# Patient Record
Sex: MALE | Race: WHITE | HISPANIC OR LATINO | Employment: PART TIME | URBAN - NONMETROPOLITAN AREA
[De-identification: names, ages, dates, MRNs, and addresses within clinical notes are randomized per-mention and may not be internally consistent; named-entity substitution may affect disease eponyms.]

---

## 2024-02-08 ENCOUNTER — OFFICE VISIT (OUTPATIENT)
Dept: UROLOGY | Facility: CLINIC | Age: 29
End: 2024-02-08
Payer: MEDICAID

## 2024-02-08 ENCOUNTER — PATIENT ROUNDING (BHMG ONLY) (OUTPATIENT)
Dept: UROLOGY | Facility: CLINIC | Age: 29
End: 2024-02-08

## 2024-02-08 VITALS
DIASTOLIC BLOOD PRESSURE: 78 MMHG | HEIGHT: 69 IN | TEMPERATURE: 98 F | SYSTOLIC BLOOD PRESSURE: 116 MMHG | BODY MASS INDEX: 34.66 KG/M2 | WEIGHT: 234 LBS

## 2024-02-08 DIAGNOSIS — R31.0 GROSS HEMATURIA: Primary | ICD-10-CM

## 2024-02-08 DIAGNOSIS — R36.1 BLOOD IN SEMEN: ICD-10-CM

## 2024-02-08 LAB
BILIRUB BLD-MCNC: NEGATIVE MG/DL
CLARITY, POC: CLEAR
COLOR UR: YELLOW
EXPIRATION DATE: ABNORMAL
GLUCOSE UR STRIP-MCNC: NEGATIVE MG/DL
KETONES UR QL: NEGATIVE
LEUKOCYTE EST, POC: NEGATIVE
Lab: ABNORMAL
NITRITE UR-MCNC: NEGATIVE MG/ML
PH UR: 7.5 [PH] (ref 5–8)
PROT UR STRIP-MCNC: ABNORMAL MG/DL
RBC # UR STRIP: NEGATIVE /UL
SP GR UR: 1.01 (ref 1–1.03)
UROBILINOGEN UR QL: NORMAL

## 2024-02-08 RX ORDER — ACETAMINOPHEN 500 MG
500 TABLET ORAL
COMMUNITY

## 2024-02-08 RX ORDER — LISINOPRIL AND HYDROCHLOROTHIAZIDE 20; 12.5 MG/1; MG/1
1 TABLET ORAL DAILY
COMMUNITY
Start: 2023-10-16 | End: 2024-02-08

## 2024-02-08 RX ORDER — METOCLOPRAMIDE 10 MG/1
TABLET ORAL
COMMUNITY
Start: 2023-10-16 | End: 2024-02-08

## 2024-02-08 NOTE — PROGRESS NOTES
Office Visit Hematuria Male      Patient Name: Kenji Pizarro  : 1995   MRN: [id number]     Chief Complaint: Gross hematuria.   Chief Complaint   Patient presents with    Gross Hematuria       Referring Provider: Mahi Islas*    History of Present Illness: Kenji Pizarro is a 28 y.o. male who presents today presents with gross hematuria x 1 episode, 1 month ago.  1 week later he reports that he had blood in his ejaculation x 2 episodes.  Denies fever, chills, sweats, pain, testicular swelling, penile swelling or lesions, n/v/d.  No family history of prostate cancer or bladder cancer.  No known injury to pelvic area.     History of smoking?    Socially  History of second-hand smoking exposure? no  History of chemotherapy?   no  History of radiation?    no  History of kidney or bladder stones?  no  History of frequent urinary tract infections? no    Subjective      Review of System:   Review of Systems   Constitutional:  Negative for activity change, appetite change, chills, diaphoresis and fever.   Gastrointestinal:  Negative for abdominal pain, blood in stool, constipation, diarrhea, nausea and vomiting.   Genitourinary:  Positive for discharge (bleeding with ejacuation x 2 episodes) and hematuria. Negative for decreased urine volume, dysuria, frequency, nocturia, penile pain, erectile dysfunction, penile swelling, scrotal swelling, testicular pain, urgency and urinary incontinence.   All other systems reviewed and are negative.     Past Medical History: History reviewed. No pertinent past medical history.    Past Surgical History: History reviewed. No pertinent surgical history.    Family History:   Family History   Problem Relation Age of Onset    Hyperlipidemia Father     Hypertension Mother     Diabetes Mother     No Known Problems Sister     No Known Problems Sister     Diabetes Brother     No Known Problems Brother     No Known Problems Son      No family history of prostate, bladder  "or kidney cancer.   No family history of kidney stones.     Social History:   Social History     Socioeconomic History    Marital status: Significant Other   Tobacco Use    Smoking status: Some Days     Types: Cigarettes     Passive exposure: Never    Smokeless tobacco: Never    Tobacco comments:     1 cigarette every 2-3 months   Vaping Use    Vaping Use: Never used    Passive vaping exposure: Yes   Substance and Sexual Activity    Alcohol use: Defer    Drug use: Never    Sexual activity: Yes     Partners: Female     Medications:     Current Outpatient Medications:     acetaminophen (TYLENOL) 500 MG tablet, Take 1 tablet by mouth., Disp: , Rfl:     Allergies:   No Known Allergies    Objective     Physical Exam:   Vital Signs:   Vitals:    02/08/24 0947   BP: 116/78   BP Location: Left arm   Patient Position: Sitting   Cuff Size: Adult   Temp: 98 °F (36.7 °C)   TempSrc: Temporal   Weight: 106 kg (234 lb)   Height: 175.3 cm (69\")     Body mass index is 34.56 kg/m².   Physical Exam  Vitals and nursing note reviewed.   Constitutional:       Appearance: Normal appearance. He is well-groomed. He is obese. He is not ill-appearing.   Abdominal:      Palpations: Abdomen is soft.      Tenderness: There is no abdominal tenderness.      Hernia: No hernia is present. There is no hernia in the left inguinal area or right inguinal area.   Genitourinary:     Penis: Normal and uncircumcised. No tenderness, discharge, swelling or lesions.       Testes: Normal.         Right: Mass, tenderness or swelling not present.         Left: Mass, tenderness or swelling not present.      Epididymis:      Right: Normal.      Left: Normal.   Neurological:      Mental Status: He is alert.       Labs  Brief Urine Lab Results  (Last result in the past 365 days)        Color   Clarity   Blood   Leuk Est   Nitrite   Protein   CREAT   Urine HCG        02/08/24 0951 Yellow   Clear   Negative   Negative   Negative   2+                  Radiologic " Studies       Assessment / Plan      Assessment  28 y.o. male who presents with gross hematuria. New diagnosis of unknown clinical significance.    Risk factors include occasional smoker and ecigarette exposure at work.  In order to complete the hematuria work up we need to perform a flexible cystoscopy and acquire upper tract imaging.   UA was negative for blood and evidence of infection.    We discussed that blood with ejaculation is likely benign and will resolve on its own.     Plan  1.  We discussed the indications for diagnostic flexible cystoscopy to be performed at the next clinic visit.   2.  CT urogram ordered and to be completed prior to his follow up with Dr. Beasley.      Follow Up:   Return in about 4 weeks (around 3/7/2024) for Follow up with Dr. Beasley with Cystoscopy; CT urogram prior .      JON Bianchi, MSN, FNP-C  [facility]

## 2024-02-09 NOTE — PROGRESS NOTES
February 9, 2024    Hello, may I speak with Kenji Pizarro? Spoke with pt.    My name is Taina.      I am  with [facility] [place] [place] [facility]  [address]  [address]  [facility] [address] [phone].    Before we get started may I verify your date of birth? 1995    I am calling to officially welcome you to our practice and ask about your recent visit. Is this a good time to talk? Yes.    Tell me about your visit with us. What things went well?    Pt states his visit was great and that he looks forward to his procedure and getting some answers.     We're always looking for ways to make our patients' experiences even better. Do you have recommendations on ways we may improve?  No    Overall were you satisfied with your first visit to our practice? Very satisfied.       I appreciate you taking the time to speak with me today. Is there anything else I can do for you? No      Thank you, and have a great day.

## 2024-04-26 ENCOUNTER — TELEPHONE (OUTPATIENT)
Dept: UROLOGY | Facility: CLINIC | Age: 29
End: 2024-04-26
Payer: MEDICAID

## 2024-04-26 NOTE — TELEPHONE ENCOUNTER
I tried called patient to see when he was getting his CT due to him supposed to have it done prior to appointment. Patient did not answer and was unable to leave a voicemail to return my call.     Nomi ORTEGA

## 2025-03-17 ENCOUNTER — APPOINTMENT (OUTPATIENT)
Dept: GENERAL RADIOLOGY | Facility: HOSPITAL | Age: 30
End: 2025-03-17

## 2025-03-17 ENCOUNTER — HOSPITAL ENCOUNTER (INPATIENT)
Facility: HOSPITAL | Age: 30
LOS: 2 days | Discharge: HOME | End: 2025-03-19
Attending: EMERGENCY MEDICINE | Admitting: FAMILY MEDICINE

## 2025-03-17 ENCOUNTER — APPOINTMENT (OUTPATIENT)
Dept: CARDIOLOGY | Facility: HOSPITAL | Age: 30
End: 2025-03-17

## 2025-03-17 ENCOUNTER — APPOINTMENT (OUTPATIENT)
Dept: CT IMAGING | Facility: HOSPITAL | Age: 30
End: 2025-03-17

## 2025-03-17 ENCOUNTER — APPOINTMENT (OUTPATIENT)
Dept: ULTRASOUND IMAGING | Facility: HOSPITAL | Age: 30
End: 2025-03-17

## 2025-03-17 DIAGNOSIS — R79.89 ELEVATED TROPONIN: ICD-10-CM

## 2025-03-17 DIAGNOSIS — R60.0 PERIPHERAL EDEMA: ICD-10-CM

## 2025-03-17 DIAGNOSIS — I50.21 ACUTE SYSTOLIC CONGESTIVE HEART FAILURE: ICD-10-CM

## 2025-03-17 DIAGNOSIS — R09.02 HYPOXIA: ICD-10-CM

## 2025-03-17 DIAGNOSIS — R80.9 PROTEINURIA, UNSPECIFIED TYPE: ICD-10-CM

## 2025-03-17 DIAGNOSIS — I50.9 ACUTE ON CHRONIC CONGESTIVE HEART FAILURE, UNSPECIFIED HEART FAILURE TYPE: Primary | ICD-10-CM

## 2025-03-17 LAB
ALBUMIN SERPL-MCNC: 3.8 G/DL (ref 3.5–5.2)
ALBUMIN/GLOB SERPL: 1.3 G/DL
ALP SERPL-CCNC: 80 U/L (ref 39–117)
ALT SERPL W P-5'-P-CCNC: 43 U/L (ref 1–41)
ANION GAP SERPL CALCULATED.3IONS-SCNC: 9.6 MMOL/L (ref 5–15)
AORTIC DIMENSIONLESS INDEX: 0.69 (DI)
AST SERPL-CCNC: 46 U/L (ref 1–40)
AV MEAN PRESS GRAD SYS DOP V1V2: 3 MMHG
AV VMAX SYS DOP: 106 CM/SEC
B PARAPERT DNA SPEC QL NAA+PROBE: NOT DETECTED
B PERT DNA SPEC QL NAA+PROBE: NOT DETECTED
BACTERIA UR QL AUTO: ABNORMAL /HPF
BASOPHILS # BLD AUTO: 0.09 10*3/MM3 (ref 0–0.2)
BASOPHILS NFR BLD AUTO: 1 % (ref 0–1.5)
BH CV ECHO MEAS - AO MAX PG: 4.5 MMHG
BH CV ECHO MEAS - AO ROOT DIAM: 3.4 CM
BH CV ECHO MEAS - AO V2 VTI: 17.5 CM
BH CV ECHO MEAS - AVA(I,D): 3.6 CM2
BH CV ECHO MEAS - EDV(CUBED): 214.9 ML
BH CV ECHO MEAS - EDV(MOD-SP2): 177 ML
BH CV ECHO MEAS - EDV(MOD-SP4): 276 ML
BH CV ECHO MEAS - EF(MOD-SP2): 58.7 %
BH CV ECHO MEAS - EF(MOD-SP4): 37.3 %
BH CV ECHO MEAS - ESV(CUBED): 143.9 ML
BH CV ECHO MEAS - ESV(MOD-SP2): 73.1 ML
BH CV ECHO MEAS - ESV(MOD-SP4): 173 ML
BH CV ECHO MEAS - FS: 12.5 %
BH CV ECHO MEAS - IVS/LVPW: 1.48 CM
BH CV ECHO MEAS - IVSD: 1.84 CM
BH CV ECHO MEAS - LA DIMENSION: 3.4 CM
BH CV ECHO MEAS - LAT PEAK E' VEL: 8.7 CM/SEC
BH CV ECHO MEAS - LV DIASTOLIC VOL/BSA (35-75): 124.2 CM2
BH CV ECHO MEAS - LV MASS(C)D: 442.6 GRAMS
BH CV ECHO MEAS - LV MAX PG: 2.25 MMHG
BH CV ECHO MEAS - LV MEAN PG: 1 MMHG
BH CV ECHO MEAS - LV SYSTOLIC VOL/BSA (12-30): 77.9 CM2
BH CV ECHO MEAS - LV V1 MAX: 75 CM/SEC
BH CV ECHO MEAS - LV V1 VTI: 12 CM
BH CV ECHO MEAS - LVIDD: 6 CM
BH CV ECHO MEAS - LVIDS: 5.2 CM
BH CV ECHO MEAS - LVOT AREA: 5.3 CM2
BH CV ECHO MEAS - LVOT DIAM: 2.6 CM
BH CV ECHO MEAS - LVPWD: 1.24 CM
BH CV ECHO MEAS - MED PEAK E' VEL: 5 CM/SEC
BH CV ECHO MEAS - MV A MAX VEL: 64.5 CM/SEC
BH CV ECHO MEAS - MV DEC TIME: 0.23 SEC
BH CV ECHO MEAS - MV E MAX VEL: 92.7 CM/SEC
BH CV ECHO MEAS - MV E/A: 1.44
BH CV ECHO MEAS - MV MAX PG: 6.5 MMHG
BH CV ECHO MEAS - MV MEAN PG: 3 MMHG
BH CV ECHO MEAS - MV V2 VTI: 28.9 CM
BH CV ECHO MEAS - MVA(VTI): 2.19 CM2
BH CV ECHO MEAS - PA ACC TIME: 0.15 SEC
BH CV ECHO MEAS - PA V2 MAX: 73 CM/SEC
BH CV ECHO MEAS - RAP SYSTOLE: 8 MMHG
BH CV ECHO MEAS - RV MAX PG: 1.52 MMHG
BH CV ECHO MEAS - RV V1 MAX: 61.6 CM/SEC
BH CV ECHO MEAS - RV V1 VTI: 11 CM
BH CV ECHO MEAS - SV(LVOT): 63.2 ML
BH CV ECHO MEAS - SV(MOD-SP2): 103.9 ML
BH CV ECHO MEAS - SV(MOD-SP4): 103 ML
BH CV ECHO MEAS - SVI(LVOT): 28.5 ML/M2
BH CV ECHO MEAS - SVI(MOD-SP2): 46.8 ML/M2
BH CV ECHO MEAS - SVI(MOD-SP4): 46.4 ML/M2
BH CV ECHO MEAS - TAPSE (>1.6): 1.64 CM
BH CV ECHO MEASUREMENTS AVERAGE E/E' RATIO: 13.53
BH CV XLRA - RV BASE: 4.3 CM
BH CV XLRA - RV LENGTH: 10.4 CM
BH CV XLRA - RV MID: 3.9 CM
BH CV XLRA - TDI S': 8.8 CM/SEC
BILIRUB SERPL-MCNC: 0.9 MG/DL (ref 0–1.2)
BILIRUB UR QL STRIP: ABNORMAL
BUN SERPL-MCNC: 16 MG/DL (ref 6–20)
BUN/CREAT SERPL: 13.8 (ref 7–25)
C PNEUM DNA NPH QL NAA+NON-PROBE: NOT DETECTED
CALCIUM SPEC-SCNC: 8.7 MG/DL (ref 8.6–10.5)
CHLORIDE SERPL-SCNC: 105 MMOL/L (ref 98–107)
CLARITY UR: CLEAR
CO2 SERPL-SCNC: 24.4 MMOL/L (ref 22–29)
COLOR UR: ABNORMAL
CREAT SERPL-MCNC: 1.16 MG/DL (ref 0.76–1.27)
CREAT UR-MCNC: 325.6 MG/DL
DEPRECATED RDW RBC AUTO: 41.4 FL (ref 37–54)
EGFRCR SERPLBLD CKD-EPI 2021: 86.4 ML/MIN/1.73
EOSINOPHIL # BLD AUTO: 0.23 10*3/MM3 (ref 0–0.4)
EOSINOPHIL NFR BLD AUTO: 2.5 % (ref 0.3–6.2)
ERYTHROCYTE [DISTWIDTH] IN BLOOD BY AUTOMATED COUNT: 13.5 % (ref 12.3–15.4)
FLUAV SUBTYP SPEC NAA+PROBE: NOT DETECTED
FLUBV RNA ISLT QL NAA+PROBE: NOT DETECTED
GEN 5 1HR TROPONIN T REFLEX: 84 NG/L
GLOBULIN UR ELPH-MCNC: 3 GM/DL
GLUCOSE BLDC GLUCOMTR-MCNC: 84 MG/DL (ref 70–130)
GLUCOSE SERPL-MCNC: 98 MG/DL (ref 65–99)
GLUCOSE UR STRIP-MCNC: NEGATIVE MG/DL
HADV DNA SPEC NAA+PROBE: NOT DETECTED
HCOV 229E RNA SPEC QL NAA+PROBE: NOT DETECTED
HCOV HKU1 RNA SPEC QL NAA+PROBE: NOT DETECTED
HCOV NL63 RNA SPEC QL NAA+PROBE: NOT DETECTED
HCOV OC43 RNA SPEC QL NAA+PROBE: NOT DETECTED
HCT VFR BLD AUTO: 44.2 % (ref 37.5–51)
HGB BLD-MCNC: 14.7 G/DL (ref 13–17.7)
HGB UR QL STRIP.AUTO: NEGATIVE
HMPV RNA NPH QL NAA+NON-PROBE: NOT DETECTED
HPIV1 RNA ISLT QL NAA+PROBE: NOT DETECTED
HPIV2 RNA SPEC QL NAA+PROBE: NOT DETECTED
HPIV3 RNA NPH QL NAA+PROBE: NOT DETECTED
HPIV4 P GENE NPH QL NAA+PROBE: NOT DETECTED
HYALINE CASTS UR QL AUTO: ABNORMAL /LPF
IMM GRANULOCYTES # BLD AUTO: 0.02 10*3/MM3 (ref 0–0.05)
IMM GRANULOCYTES NFR BLD AUTO: 0.2 % (ref 0–0.5)
KETONES UR QL STRIP: ABNORMAL
LEFT ATRIUM VOLUME INDEX: 45.9 ML/M2
LEUKOCYTE ESTERASE UR QL STRIP.AUTO: ABNORMAL
LV EF BIPLANE MOD: 49.6 %
LYMPHOCYTES # BLD AUTO: 1.92 10*3/MM3 (ref 0.7–3.1)
LYMPHOCYTES NFR BLD AUTO: 21 % (ref 19.6–45.3)
M PNEUMO IGG SER IA-ACNC: NOT DETECTED
MCH RBC QN AUTO: 28.2 PG (ref 26.6–33)
MCHC RBC AUTO-ENTMCNC: 33.3 G/DL (ref 31.5–35.7)
MCV RBC AUTO: 84.7 FL (ref 79–97)
MONOCYTES # BLD AUTO: 0.67 10*3/MM3 (ref 0.1–0.9)
MONOCYTES NFR BLD AUTO: 7.3 % (ref 5–12)
NEUTROPHILS NFR BLD AUTO: 6.21 10*3/MM3 (ref 1.7–7)
NEUTROPHILS NFR BLD AUTO: 68 % (ref 42.7–76)
NITRITE UR QL STRIP: NEGATIVE
NRBC BLD AUTO-RTO: 0 /100 WBC (ref 0–0.2)
NT-PROBNP SERPL-MCNC: 2235 PG/ML (ref 0–450)
PH UR STRIP.AUTO: 7 [PH] (ref 5–8)
PLATELET # BLD AUTO: 298 10*3/MM3 (ref 140–450)
PMV BLD AUTO: 10.5 FL (ref 6–12)
POTASSIUM SERPL-SCNC: 4 MMOL/L (ref 3.5–5.2)
PROT ?TM UR-MCNC: 321 MG/DL
PROT SERPL-MCNC: 6.8 G/DL (ref 6–8.5)
PROT UR QL STRIP: ABNORMAL
PROT/CREAT UR: 985.9 MG/G CREA (ref 0–200)
RBC # BLD AUTO: 5.22 10*6/MM3 (ref 4.14–5.8)
RBC # UR STRIP: ABNORMAL /HPF
REF LAB TEST METHOD: ABNORMAL
RHINOVIRUS RNA SPEC NAA+PROBE: NOT DETECTED
RSV RNA NPH QL NAA+NON-PROBE: NOT DETECTED
SARS-COV-2 RNA RESP QL NAA+PROBE: NOT DETECTED
SODIUM SERPL-SCNC: 139 MMOL/L (ref 136–145)
SP GR UR STRIP: 1.03 (ref 1–1.03)
SQUAMOUS #/AREA URNS HPF: ABNORMAL /HPF
TROPONIN T % DELTA: 9
TROPONIN T NUMERIC DELTA: 7 NG/L
TROPONIN T SERPL HS-MCNC: 77 NG/L
UROBILINOGEN UR QL STRIP: ABNORMAL
WBC # UR STRIP: ABNORMAL /HPF
WBC NRBC COR # BLD AUTO: 9.14 10*3/MM3 (ref 3.4–10.8)

## 2025-03-17 PROCEDURE — 25510000001 IOPAMIDOL 61 % SOLUTION: Performed by: EMERGENCY MEDICINE

## 2025-03-17 PROCEDURE — 25010000002 LABETALOL 5 MG/ML SOLUTION: Performed by: FAMILY MEDICINE

## 2025-03-17 PROCEDURE — 80053 COMPREHEN METABOLIC PANEL: CPT | Performed by: EMERGENCY MEDICINE

## 2025-03-17 PROCEDURE — 93306 TTE W/DOPPLER COMPLETE: CPT

## 2025-03-17 PROCEDURE — 93005 ELECTROCARDIOGRAM TRACING: CPT | Performed by: EMERGENCY MEDICINE

## 2025-03-17 PROCEDURE — 84156 ASSAY OF PROTEIN URINE: CPT | Performed by: INTERNAL MEDICINE

## 2025-03-17 PROCEDURE — 25010000002 FUROSEMIDE PER 20 MG: Performed by: EMERGENCY MEDICINE

## 2025-03-17 PROCEDURE — 25010000002 HEPARIN (PORCINE) PER 1000 UNITS: Performed by: FAMILY MEDICINE

## 2025-03-17 PROCEDURE — 99223 1ST HOSP IP/OBS HIGH 75: CPT | Performed by: FAMILY MEDICINE

## 2025-03-17 PROCEDURE — 84484 ASSAY OF TROPONIN QUANT: CPT | Performed by: EMERGENCY MEDICINE

## 2025-03-17 PROCEDURE — 0202U NFCT DS 22 TRGT SARS-COV-2: CPT | Performed by: EMERGENCY MEDICINE

## 2025-03-17 PROCEDURE — 81001 URINALYSIS AUTO W/SCOPE: CPT | Performed by: EMERGENCY MEDICINE

## 2025-03-17 PROCEDURE — 82570 ASSAY OF URINE CREATININE: CPT | Performed by: INTERNAL MEDICINE

## 2025-03-17 PROCEDURE — 71045 X-RAY EXAM CHEST 1 VIEW: CPT

## 2025-03-17 PROCEDURE — 71275 CT ANGIOGRAPHY CHEST: CPT

## 2025-03-17 PROCEDURE — 83880 ASSAY OF NATRIURETIC PEPTIDE: CPT | Performed by: EMERGENCY MEDICINE

## 2025-03-17 PROCEDURE — 93970 EXTREMITY STUDY: CPT

## 2025-03-17 PROCEDURE — 82948 REAGENT STRIP/BLOOD GLUCOSE: CPT

## 2025-03-17 PROCEDURE — 99291 CRITICAL CARE FIRST HOUR: CPT

## 2025-03-17 PROCEDURE — 85025 COMPLETE CBC W/AUTO DIFF WBC: CPT | Performed by: EMERGENCY MEDICINE

## 2025-03-17 PROCEDURE — 99285 EMERGENCY DEPT VISIT HI MDM: CPT | Performed by: EMERGENCY MEDICINE

## 2025-03-17 PROCEDURE — 93306 TTE W/DOPPLER COMPLETE: CPT | Performed by: INTERNAL MEDICINE

## 2025-03-17 RX ORDER — FUROSEMIDE 10 MG/ML
80 INJECTION INTRAMUSCULAR; INTRAVENOUS ONCE
Status: COMPLETED | OUTPATIENT
Start: 2025-03-17 | End: 2025-03-17

## 2025-03-17 RX ORDER — NITROGLYCERIN 0.4 MG/1
0.4 TABLET SUBLINGUAL
Status: DISCONTINUED | OUTPATIENT
Start: 2025-03-17 | End: 2025-03-19 | Stop reason: HOSPADM

## 2025-03-17 RX ORDER — FUROSEMIDE 10 MG/ML
80 INJECTION INTRAMUSCULAR; INTRAVENOUS 2 TIMES DAILY
Status: DISCONTINUED | OUTPATIENT
Start: 2025-03-17 | End: 2025-03-17

## 2025-03-17 RX ORDER — LISINOPRIL 10 MG/1
10 TABLET ORAL DAILY
COMMUNITY
Start: 2025-03-14 | End: 2025-03-19 | Stop reason: HOSPADM

## 2025-03-17 RX ORDER — HEPARIN SODIUM 5000 [USP'U]/ML
5000 INJECTION, SOLUTION INTRAVENOUS; SUBCUTANEOUS EVERY 8 HOURS SCHEDULED
Status: DISCONTINUED | OUTPATIENT
Start: 2025-03-17 | End: 2025-03-18

## 2025-03-17 RX ORDER — OMEPRAZOLE 20 MG/1
20 CAPSULE, DELAYED RELEASE ORAL DAILY
COMMUNITY
Start: 2025-03-14 | End: 2025-03-24

## 2025-03-17 RX ORDER — CARVEDILOL 25 MG/1
25 TABLET ORAL ONCE
Status: COMPLETED | OUTPATIENT
Start: 2025-03-17 | End: 2025-03-17

## 2025-03-17 RX ORDER — IOPAMIDOL 612 MG/ML
100 INJECTION, SOLUTION INTRAVASCULAR
Status: COMPLETED | OUTPATIENT
Start: 2025-03-17 | End: 2025-03-17

## 2025-03-17 RX ORDER — FUROSEMIDE 10 MG/ML
40 INJECTION INTRAMUSCULAR; INTRAVENOUS ONCE
Status: COMPLETED | OUTPATIENT
Start: 2025-03-17 | End: 2025-03-17

## 2025-03-17 RX ORDER — HYDROCHLOROTHIAZIDE 12.5 MG/1
12.5 TABLET ORAL DAILY
COMMUNITY
Start: 2025-03-14 | End: 2025-03-19 | Stop reason: HOSPADM

## 2025-03-17 RX ORDER — LABETALOL HYDROCHLORIDE 5 MG/ML
10 INJECTION, SOLUTION INTRAVENOUS ONCE
Status: COMPLETED | OUTPATIENT
Start: 2025-03-17 | End: 2025-03-17

## 2025-03-17 RX ADMIN — LABETALOL HYDROCHLORIDE 10 MG: 5 INJECTION, SOLUTION INTRAVENOUS at 10:29

## 2025-03-17 RX ADMIN — HEPARIN SODIUM 5000 UNITS: 5000 INJECTION INTRAVENOUS; SUBCUTANEOUS at 13:36

## 2025-03-17 RX ADMIN — IOPAMIDOL 100 ML: 612 INJECTION, SOLUTION INTRAVENOUS at 08:12

## 2025-03-17 RX ADMIN — CARVEDILOL 25 MG: 25 TABLET, FILM COATED ORAL at 09:38

## 2025-03-17 RX ADMIN — FUROSEMIDE 80 MG: 10 INJECTION, SOLUTION INTRAMUSCULAR; INTRAVENOUS at 08:10

## 2025-03-17 RX ADMIN — FUROSEMIDE 40 MG: 10 INJECTION, SOLUTION INTRAMUSCULAR; INTRAVENOUS at 13:38

## 2025-03-17 NOTE — H&P (VIEW-ONLY)
"     [facility] Cardiology Consult Note    Kenji Pizarro  1995  [id number]  23    Requesting Physician: No ref. provider found    Chief Complaint: Shortness of breath and lower extremity swelling    History of Present Illness:   Mr. Kenji Pizarro is a 29 y.o. male who is being seen by Cardiology for evaluation of volume overload.  The patient has a past medical history significant for hypertension and GERD.  He does not have any significant past cardiac history.  The patient reports an approximate 2-week history of progressive exertional dyspnea.  He also reports a several day history of bilateral lower extremity swelling.  He has had occasional episodes of chest pain over the past 2 weeks, described as a \"sharp and stabbing\" pain located in his lower chest/epigastric area.  He reports the discomfort was worse with deep inspiration.  No associated nausea, vomiting, or diaphoresis.    Upon evaluation the emergency department, the patient was hypertensive with a systolic BP in the 180s and diastolic BP in the 130s-140s.  He was found to be volume overloaded on exam.  Initial labs showed a creatinine of 1.16, with mild elevation of his AST and ALT.  A proBNP was elevated at 2235.  An initial high-sensitivity troponin was elevated at 77, found to be 84 on recheck.  Cardiology has been consulted for further recommendations.    Prior Cardiac Testin. Last Coronary Angio: None  2. Prior Stress Testing: None  3. Last Echo: None  4. Prior Holter Monitor: None    Review of Systems:   Review of Systems   Constitutional:  Negative for activity change, appetite change, chills, diaphoresis, fatigue, fever, unexpected weight gain and unexpected weight loss.   Eyes:  Negative for blurred vision and double vision.   Respiratory:  Positive for shortness of breath. Negative for cough, chest tightness and wheezing.    Cardiovascular:  Positive for chest pain and leg swelling. Negative for palpitations. "   Gastrointestinal:  Negative for abdominal pain, anal bleeding, blood in stool and GERD.   Endocrine: Negative for cold intolerance and heat intolerance.   Genitourinary:  Negative for hematuria.   Neurological:  Negative for dizziness, syncope, weakness and light-headedness.   Hematological:  Does not bruise/bleed easily.   Psychiatric/Behavioral:  Negative for depressed mood and stress. The patient is not nervous/anxious.        Past Medical History: History reviewed. No pertinent past medical history.    Past Surgical History: History reviewed. No pertinent surgical history.    Family History:   Family History   Problem Relation Age of Onset    Hyperlipidemia Father     Hypertension Mother     Diabetes Mother     No Known Problems Sister     No Known Problems Sister     Diabetes Brother     No Known Problems Brother     No Known Problems Son        Social History:   Social History     Socioeconomic History    Marital status: Single   Tobacco Use    Smoking status: Some Days     Types: Cigarettes     Passive exposure: Never    Smokeless tobacco: Never    Tobacco comments:     1 cigarette every 2-3 months   Vaping Use    Vaping status: Never Used    Passive vaping exposure: Yes   Substance and Sexual Activity    Alcohol use: Defer    Drug use: Never    Sexual activity: Yes     Partners: Female       Medications:     Current Facility-Administered Medications:     heparin (porcine) 5000 UNIT/ML injection 5,000 Units, 5,000 Units, Subcutaneous, Q8H, Elias Cash DO, 5,000 Units at 03/17/25 1336    nitroglycerin (NITROSTAT) SL tablet 0.4 mg, 0.4 mg, Sublingual, Q5 Min PRN, Elias Cash DO    Current Outpatient Medications:     hydroCHLOROthiazide 12.5 MG tablet, Take 1 tablet by mouth Daily., Disp: , Rfl:     lisinopril (PRINIVIL,ZESTRIL) 10 MG tablet, Take 1 tablet by mouth Daily., Disp: , Rfl:     omeprazole (priLOSEC) 20 MG capsule, Take 1 capsule by mouth Daily., Disp: , Rfl:     acetaminophen (TYLENOL) 500  "MG tablet, Take 1 tablet by mouth., Disp: , Rfl:     Allergies:   No Known Allergies    Physical Exam:  Vital Signs:   Vitals:    03/17/25 1346 03/17/25 1401 03/17/25 1416 03/17/25 1418   BP: 110/79 120/87 110/74 116/82   BP Location:       Patient Position:       Pulse: 82 82 84    Resp:       Temp:       TempSrc:       SpO2: 96% 94% 95%    Weight:    116 kg (256 lb)   Height:    167.6 cm (66\")       Physical Exam  Constitutional:       General: He is not in acute distress.     Appearance: He is obese. He is not diaphoretic.   HENT:      Head: Normocephalic and atraumatic.   Cardiovascular:      Rate and Rhythm: Normal rate and regular rhythm.      Heart sounds: No murmur heard.  Pulmonary:      Effort: Pulmonary effort is normal. No respiratory distress.      Breath sounds: No stridor. No wheezing, rhonchi or rales.      Comments: Scattered crackles bilaterally  Abdominal:      General: Bowel sounds are normal. There is no distension.      Palpations: Abdomen is soft.      Tenderness: There is no abdominal tenderness. There is no guarding or rebound.   Musculoskeletal:         General: Swelling present. Normal range of motion.      Cervical back: Neck supple. No tenderness.   Skin:     General: Skin is warm and dry.   Neurological:      General: No focal deficit present.      Mental Status: He is alert and oriented to person, place, and time.   Psychiatric:         Mood and Affect: Mood normal.         Behavior: Behavior normal.         Results Review:   Results from last 7 days   Lab Units 03/17/25  0628   SODIUM mmol/L 139   POTASSIUM mmol/L 4.0   CHLORIDE mmol/L 105   CO2 mmol/L 24.4   BUN mg/dL 16   CREATININE mg/dL 1.16   CALCIUM mg/dL 8.7   BILIRUBIN mg/dL 0.9   ALK PHOS U/L 80   ALT (SGPT) U/L 43*   AST (SGOT) U/L 46*   GLUCOSE mg/dL 98     Results from last 7 days   Lab Units 03/17/25  0729 03/17/25  0628   HSTROP T ng/L 84* 77*     Results from last 7 days   Lab Units 03/17/25  0628   WBC 10*3/mm3 9.14 "   HEMOGLOBIN g/dL 14.7   HEMATOCRIT % 44.2   PLATELETS 10*3/mm3 298                   I personally viewed and interpreted the patient's EKG/Telemetry data     Assessment:  1.  Acute systolic heart failure  2.  Elevated troponin level  3.  Hypertension  4.  Obesity, BMI 41    Plan:  1.  Acute systolic heart failure  --Echocardiogram shows LVEF 35-40%, moderate global LV hypokinesis  -- Several potential etiologies, including ischemic cardiomyopathy  -- Continue Lasix for diuresis  --Agree with carvedilol  -- As volume status improves, will start Entresto  -- SGLT2 inhibitor on discharge  -- Discussed the risks and benefits of coronary angiogram to rule out ischemic cardiomyopathy, the patient is agreeable to proceed  -- N.p.o. after midnight with plans for an angiogram tomorrow morning    2.  Elevated troponin level  -- No ischemic changes on ECG  -- No current anginal symptoms  -- Suspect troponin elevation likely demand ischemia, lower suspicion for ACS  -- No need to continue trending troponins  -- Coronary angiogram tomorrow morning, as above    Thank you for allowing me to participate in the care of your patient. Please do not hesitate to contact me with additional questions or concerns.     LYNN Rainey MD  Interventional Cardiology   03/17/25  14:44 EDT

## 2025-03-17 NOTE — Clinical Note
Hemostasis started on the right radial artery. R-Band was used in achieving hemostasis. Radial compression device applied to vessel. Hemostasis achieved successfully. Closure device additional comment: 13 mL 3953

## 2025-03-17 NOTE — CASE MANAGEMENT/SOCIAL WORK
Discharge Planning Assessment   Ra     Patient Name: Kenji Pizarro  MRN: [id number]  Today's Date: 3/17/2025    Admit Date: 3/17/2025    Plan: Patient plans to return home with family; denies needs at this time   Discharge Needs Assessment       Row Name 03/17/25 1124       Living Environment    People in Home child(yogi), dependent;significant other    Name(s) of People in Home Dianne Burton, Significant Other and their 2 year old son    Current Living Arrangements home    Duration at Residence 1 1/2 years    Potentially Unsafe Housing Conditions none    In the past 12 months has the electric, gas, oil, or water company threatened to shut off services in your home? No    Primary Care Provided by self    Provides Primary Care For child(yogi)    Family Caregiver if Needed none    Quality of Family Relationships helpful;involved;supportive    Able to Return to Prior Arrangements yes       Resource/Environmental Concerns    Resource/Environmental Concerns none    Transportation Concerns none       Transportation Needs    In the past 12 months, has lack of transportation kept you from medical appointments or from getting medications? no    In the past 12 months, has lack of transportation kept you from meetings, work, or from getting things needed for daily living? No       Food Insecurity    Within the past 12 months, you worried that your food would run out before you got the money to buy more. Never true    Within the past 12 months, the food you bought just didn't last and you didn't have money to get more. Never true       Transition Planning    Patient/Family Anticipates Transition to home with family    Patient/Family Anticipated Services at Transition none    Transportation Anticipated family or friend will provide       Discharge Needs Assessment    Readmission Within the Last 30 Days no previous admission in last 30 days    Equipment Currently Used at Home none    Concerns to be Addressed discharge  planning    Do you want help finding or keeping work or a job? I do not need or want help    Do you want help with school or training? For example, starting or completing job training or getting a high school diploma, GED or equivalent No    Anticipated Changes Related to Illness none    Equipment Needed After Discharge none    Provided Post Acute Provider List? N/A    Provided Post Acute Provider Quality & Resource List? N/A    Offered/Gave Vendor List no                   Discharge Plan       Row Name 03/17/25 1127       Plan    Plan Patient plans to return home with family; denies needs at this time    Patient/Family in Agreement with Plan yes    Provided Post Acute Provider List? N/A    Provided Post Acute Provider Quality & Resource List? N/A    Plan Comments Patient is awake and able to answer questions.  His significant other is at bedside and he consents for her to be present for his DC plans.  He does not have a PCP ([facility] provider directory provided at earlier conversation).  [facility] and elects to enroll in Meds to Bed.  He does not use DME.  He drinks 6 or more drinks atleast 3 times a week; denies the need for alcohol cessation resources.  At the time of DC the patient plans to return home with his wife and 2 year old son.  Questions and concerns were addressed at the time of this conversation. Will provide additional resources and information upon request.    Final Note Plans to DC home with family                       Demographic Summary       Row Name 03/17/25 1122       General Information    Admission Type inpatient    Arrived From emergency department    Referral Source admission list    Reason for Consult discharge planning    Preferred Language English       Contact Information    Permission Granted to Share Info With                    Functional Status       Row Name 03/17/25 1122       Functional Status    Usual Activity Tolerance excellent    Current  Activity Tolerance moderate       Physical Activity    On average, how many days per week do you engage in moderate to strenuous exercise (like a brisk walk)? 0 days    On average, how many minutes do you engage in exercise at this level? 0 min    Number of minutes of exercise per week 0       Assessment of Health Literacy    How often do you have someone help you read hospital materials? Never    How often do you have problems learning about your medical condition because of difficulty understanding written information? Never    How often do you have a problem understanding what is told to you about your medical condition? Never    How confident are you filling out medical forms by yourself? Extremely    Health Literacy Excellent       Functional Status, IADL    Medications independent    Meal Preparation independent    Housekeeping independent    Laundry independent    Shopping independent    If for any reason you need help with day-to-day activities such as bathing, preparing meals, shopping, managing finances, etc., do you get the help you need? I don't need any help       Mental Status    General Appearance WDL WDL       Mental Status Summary    Recent Changes in Mental Status/Cognitive Functioning no changes                   Psychosocial       Row Name 03/17/25 1123       Values/Beliefs    Spiritual, Cultural Beliefs, Yazdanism Practices, Values that Affect Care no       Behavior WDL    Behavior WDL WDL       Emotion Mood WDL    Emotion/Mood/Affect WDL WDL       Speech WDL    Speech WDL WDL       Perceptual State WDL    Perceptual State WDL WDL       Thought Process WDL    Thought Process WDL WDL       Intellectual Performance WDL    Intellectual Performance WDL WDL                   Abuse/Neglect       Row Name 03/17/25 1123       Personal Safety    Feels Unsafe at Home or Work/School no    Feels Threatened by Someone no    Does Anyone Try to Keep You From Having Contact with Others or Doing Things Outside  Your Home? no    Physical Signs of Abuse Present no                   Legal       Row Name 03/17/25 1123       Financial Resource Strain    How hard is it for you to pay for the very basics like food, housing, medical care, and heating? Not very                   Substance Abuse       Row Name 03/17/25 1123       Substance Use    Substance Use Status current alcohol use    Last Alcohol Use 03/15/25  patient consumes 36oz alcohol atleast 3 times per week                   Patient Forms    No documentation.                     Mulu Cameron RN

## 2025-03-17 NOTE — ED PROVIDER NOTES
Subjective   History of Present Illness  29-year-old male who presents for evaluation of multitude complaints.  He reports that he awoke from his sleep at roughly 4 AM this morning with a nosebleed.  He also reports that some blood was coming out of his mouth.  That was controlled prior to arrival.  He also reports some mild central chest pain without radiation into the neck, back, shoulders, arms.  He does have some lower extremity edema but he denies dyspnea change relative to his baseline.  He denies sick contacts.  No upper respiratory infectious symptoms.  No history of DVT or PE.  He is awake and alert with normal mentation.  He answers questions appropriate and appears nontoxic in no acute distress.      Review of Systems   Constitutional:  Positive for fatigue. Negative for chills and fever.   HENT:  Positive for nosebleeds. Negative for congestion, ear pain, postnasal drip, sinus pressure and sore throat.    Eyes:  Negative for pain, redness and visual disturbance.   Respiratory:  Positive for shortness of breath. Negative for cough and chest tightness.    Cardiovascular:  Positive for chest pain. Negative for palpitations and leg swelling.   Gastrointestinal:  Negative for abdominal pain, anal bleeding, blood in stool, diarrhea, nausea and vomiting.   Endocrine: Negative for polydipsia and polyuria.   Genitourinary:  Negative for difficulty urinating, dysuria, frequency and urgency.   Musculoskeletal:  Negative for arthralgias, back pain and neck pain.   Skin:  Negative for pallor and rash.   Allergic/Immunologic: Negative for environmental allergies and immunocompromised state.   Neurological:  Negative for dizziness, weakness and headaches.   Hematological:  Negative for adenopathy.   Psychiatric/Behavioral:  Negative for confusion, self-injury and suicidal ideas. The patient is not nervous/anxious.    All other systems reviewed and are negative.      History reviewed. No pertinent past medical  history.    No Known Allergies    Past Surgical History:   Procedure Laterality Date    CARDIAC CATHETERIZATION N/A 3/18/2025    Procedure: Coronary angiography;  Surgeon: Jose Rainey MD;  Location: [facility] CATH INVASIVE LOCATION;  Service: Cardiovascular;  Laterality: N/A;       Family History   Problem Relation Age of Onset    Hyperlipidemia Father     Hypertension Mother     Diabetes Mother     No Known Problems Sister     No Known Problems Sister     Diabetes Brother     No Known Problems Brother     No Known Problems Son        Social History     Socioeconomic History    Marital status: Single   Tobacco Use    Smoking status: Some Days     Types: Cigarettes     Passive exposure: Never    Smokeless tobacco: Never    Tobacco comments:     1 cigarette every 2-3 months   Vaping Use    Vaping status: Never Used    Passive vaping exposure: Yes   Substance and Sexual Activity    Alcohol use: Defer    Drug use: Never    Sexual activity: Yes     Partners: Female           Objective   Physical Exam  Vitals and nursing note reviewed.   Constitutional:       General: He is not in acute distress.     Appearance: Normal appearance. He is well-developed. He is not toxic-appearing or diaphoretic.   HENT:      Head: Normocephalic and atraumatic.      Right Ear: External ear normal.      Left Ear: External ear normal.      Nose: Nose normal.   Eyes:      General: Lids are normal.      Pupils: Pupils are equal, round, and reactive to light.   Neck:      Trachea: No tracheal deviation.   Cardiovascular:      Rate and Rhythm: Normal rate and regular rhythm.      Pulses: No decreased pulses.      Heart sounds: Normal heart sounds. No murmur heard.     No friction rub. No gallop.   Pulmonary:      Effort: Pulmonary effort is normal. No respiratory distress.      Breath sounds: Normal breath sounds. No decreased breath sounds, wheezing, rhonchi or rales.   Abdominal:      General: Bowel sounds are normal.      Palpations:  Abdomen is soft.      Tenderness: There is no abdominal tenderness. There is no guarding or rebound.   Musculoskeletal:         General: No deformity. Normal range of motion.      Cervical back: Normal range of motion and neck supple.      Right lower le+ Pitting Edema present.      Left lower le+ Pitting Edema present.   Lymphadenopathy:      Cervical: No cervical adenopathy.   Skin:     General: Skin is warm and dry.      Findings: No rash.   Neurological:      Mental Status: He is alert and oriented to person, place, and time.      Cranial Nerves: No cranial nerve deficit.      Sensory: No sensory deficit.   Psychiatric:         Speech: Speech normal.         Behavior: Behavior normal.         Thought Content: Thought content normal.         Judgment: Judgment normal.         Critical Care    Performed by: Kalpana Conteh MD  Authorized by: Kalpana Conteh MD    Critical care provider statement:     Critical care time (minutes):  45    Critical care time was exclusive of:  Separately billable procedures and treating other patients    Critical care was necessary to treat or prevent imminent or life-threatening deterioration of the following conditions:  Cardiac failure    Critical care was time spent personally by me on the following activities:  Development of treatment plan with patient or surrogate, discussions with consultants, evaluation of patient's response to treatment, blood draw for specimens, examination of patient, obtaining history from patient or surrogate, ordering and performing treatments and interventions, ordering and review of laboratory studies, ordering and review of radiographic studies, pulse oximetry, re-evaluation of patient's condition and review of old charts    I assumed direction of critical care for this patient from another provider in my specialty: no      Care discussed with: admitting provider               ED Course  ED Course as of 25 1018   Mon Mar  17, 2025   0746 The nurses have observed periods of decreased oxygen saturations.  They will drop relatively quickly but recovered relatively quickly.  His mentation remains normal during this time and he is not sleeping during these episodes. [NS]   0752 EKG performed 3/17/2025 at 6:19 AM interpreted by myself shows sinus tachycardia, right bundle branch block, pulmonary disease pattern, normal QRS, normal QTc.  No acute ST changes. [NS]   0925 The patient presents with complaint of shortness of breath.  Has 2+ lower extremity edema.  Had oxygen saturations at times that were observed into the low to mid 80s.  Chest imaging shows cardiomegaly but no pulmonary embolism.  He has crackles that give the concern for some pulmonary edema but no significant pulmonary edema reported on CT imaging.  BNP is greater than 2200.  Blood pressure was elevated with systolics between 150s to 180s.  Diastolics between 1 18-1 33.  I discussed this with Dr. Rainey and he recommends carvedilol which has been ordered.  Oxygen has been applied for correction of hypoxia.  80 mg of IV Lasix has been ordered.  The patient is stable with normal mentation at this time. The troponin was trending up from 77-84.  EKG does not show ischemic changes.  Dr. Rainey does not recommend non-STEMI medications at this time. [NS]      ED Course User Index  [NS] Kalpana Conteh MD                                                       Medical Decision Making  Differential includes hypertensive emergency, CHF exacerbation, COPD, emphysema, nephrotic syndrome, other unspecified etiology.    COVID and flu test are negative.    Chest x-ray shows cardiomegaly and pulmonary vascular congestion consistent with CHF.  IV diuresis was initiated.    Venous duplex of the bilateral legs is negative for blood clot.    CT angiogram of chest is negative for pulmonary embolism.CT angio also reports cardiomegaly, small pericardial and right pleural effusion.    Troponin is  elevated, trended up on repeat evaluation.    As stated urine does not show infection but does show a significant mount of protein which has been present previously.    Nephrology has been consulted.    Given the patient's elevated blood pressure also discussed the patient with the cardiologist, Dr. Rainey.  He recommends carvedilol which has been ordered.    I discussed the patient with the hospitalist, Dr. Cash, who will consult for admission.    Problems Addressed:  Acute on chronic congestive heart failure, unspecified heart failure type: complicated acute illness or injury with systemic symptoms that poses a threat to life or bodily functions  Elevated troponin: complicated acute illness or injury with systemic symptoms that poses a threat to life or bodily functions  Hypoxia: complicated acute illness or injury with systemic symptoms  Peripheral edema: complicated acute illness or injury with systemic symptoms that poses a threat to life or bodily functions  Proteinuria, unspecified type: complicated acute illness or injury    Amount and/or Complexity of Data Reviewed  Independent Historian: friend     Details: Friend provides additional information.  External Data Reviewed: labs, radiology, ECG and notes.  Labs: ordered. Decision-making details documented in ED Course.  Radiology: ordered and independent interpretation performed. Decision-making details documented in ED Course.  ECG/medicine tests: ordered and independent interpretation performed. Decision-making details documented in ED Course.    Risk  Prescription drug management.  Decision regarding hospitalization.        Final diagnoses:   Acute on chronic congestive heart failure, unspecified heart failure type   Hypoxia   Peripheral edema   Elevated troponin   Proteinuria, unspecified type       ED Disposition  ED Disposition       ED Disposition   Decision to Admit    Condition   --    Comment   Level of Care: Telemetry [5]   Diagnosis: CHF  (congestive heart failure) [237688]   Admitting Physician: PEDRO BELL [[id number]]   Certification: I Certify That Inpatient Hospital Services Are Medically Necessary For Greater Than 2 Midnights                 No follow-up provider specified.       Medication List        New Prescriptions      dapagliflozin 5 MG tablet tablet  Commonly known as: FARXIGA  Take 1 tablet by mouth Daily.     furosemide 40 MG tablet  Commonly known as: Lasix  Take 1 tablet by mouth Daily.     metoprolol succinate XL 50 MG 24 hr tablet  Commonly known as: TOPROL-XL  Take 1 tablet by mouth Daily.  Start taking on: March 20, 2025     sacubitril-valsartan 24-26 MG tablet  Commonly known as: Entresto  Take 1 tablet by mouth 2 (Two) Times a Day.            Stop      hydroCHLOROthiazide 12.5 MG tablet     lisinopril 10 MG tablet  Commonly known as: PRINIVIL,ZESTRIL               Where to Get Your Medications        These medications were sent to [facility] [place] [address]      Hours: Monday to Friday 8 AM to 6 PM Phone: [phone]   dapagliflozin 5 MG tablet tablet  furosemide 40 MG tablet  metoprolol succinate XL 50 MG 24 hr tablet  sacubitril-valsartan 24-26 MG tablet            Kalpana Conteh MD  03/19/25 0545

## 2025-03-17 NOTE — ED NOTES
Pt oxygen saturation dropping pretty frequently, pt dropped to 82% on RA, pt placed on 2LNC. Dr. Conteh notified.

## 2025-03-17 NOTE — CASE MANAGEMENT/SOCIAL WORK
Case Management/Social Work    Patient Name:  Kenji Pizarro  YOB: 1995  MRN: [id number]  Admit Date:  3/17/2025    Noted that the patient does not have a PCP.  Spoke to patient at bedside.  Provided with [facility] provider directory and information for [facility] and [facility].  Patient is accepting of resources.    Electronically signed by:  Mulu Cameron RN  03/17/25 09:11 EDT   [facility] Call Center    Phone Message    May a detailed message be left on voicemail: yes     Reason for Call: Pt is requesting a call back to schedule a colposcopy.  Thanks.    Action Taken: Message routed to:  [facility] p [phone]    Travel Screening: Not Applicable

## 2025-03-17 NOTE — Clinical Note
Level of Care: Telemetry [5]   Diagnosis: CHF (congestive heart failure) [197293]   Admitting Physician: PEDRO BELL [[id number]]   Certification: I Certify That Inpatient Hospital Services Are Medically Necessary For Greater Than 2 Midnights

## 2025-03-17 NOTE — NURSING NOTE
Admission    Denies chest pain. Placed on 2L 02. 88% sp02 intermittently room air. Sp02>90% on 2LNC 02.

## 2025-03-17 NOTE — CONSULTS
"     [facility] Cardiology Consult Note    Kenji Pizarro  1995  [id number]  25    Requesting Physician: No ref. provider found    Chief Complaint: Shortness of breath and lower extremity swelling    History of Present Illness:   Mr. Kenji Pizarro is a 29 y.o. male who is being seen by Cardiology for evaluation of volume overload.  The patient has a past medical history significant for hypertension and GERD.  He does not have any significant past cardiac history.  The patient reports an approximate 2-week history of progressive exertional dyspnea.  He also reports a several day history of bilateral lower extremity swelling.  He has had occasional episodes of chest pain over the past 2 weeks, described as a \"sharp and stabbing\" pain located in his lower chest/epigastric area.  He reports the discomfort was worse with deep inspiration.  No associated nausea, vomiting, or diaphoresis.    Upon evaluation the emergency department, the patient was hypertensive with a systolic BP in the 180s and diastolic BP in the 130s-140s.  He was found to be volume overloaded on exam.  Initial labs showed a creatinine of 1.16, with mild elevation of his AST and ALT.  A proBNP was elevated at 2235.  An initial high-sensitivity troponin was elevated at 77, found to be 84 on recheck.  Cardiology has been consulted for further recommendations.    Prior Cardiac Testin. Last Coronary Angio: None  2. Prior Stress Testing: None  3. Last Echo: None  4. Prior Holter Monitor: None    Review of Systems:   Review of Systems   Constitutional:  Negative for activity change, appetite change, chills, diaphoresis, fatigue, fever, unexpected weight gain and unexpected weight loss.   Eyes:  Negative for blurred vision and double vision.   Respiratory:  Positive for shortness of breath. Negative for cough, chest tightness and wheezing.    Cardiovascular:  Positive for chest pain and leg swelling. Negative for palpitations. "   Gastrointestinal:  Negative for abdominal pain, anal bleeding, blood in stool and GERD.   Endocrine: Negative for cold intolerance and heat intolerance.   Genitourinary:  Negative for hematuria.   Neurological:  Negative for dizziness, syncope, weakness and light-headedness.   Hematological:  Does not bruise/bleed easily.   Psychiatric/Behavioral:  Negative for depressed mood and stress. The patient is not nervous/anxious.        Past Medical History: History reviewed. No pertinent past medical history.    Past Surgical History: History reviewed. No pertinent surgical history.    Family History:   Family History   Problem Relation Age of Onset    Hyperlipidemia Father     Hypertension Mother     Diabetes Mother     No Known Problems Sister     No Known Problems Sister     Diabetes Brother     No Known Problems Brother     No Known Problems Son        Social History:   Social History     Socioeconomic History    Marital status: Single   Tobacco Use    Smoking status: Some Days     Types: Cigarettes     Passive exposure: Never    Smokeless tobacco: Never    Tobacco comments:     1 cigarette every 2-3 months   Vaping Use    Vaping status: Never Used    Passive vaping exposure: Yes   Substance and Sexual Activity    Alcohol use: Defer    Drug use: Never    Sexual activity: Yes     Partners: Female       Medications:     Current Facility-Administered Medications:     heparin (porcine) 5000 UNIT/ML injection 5,000 Units, 5,000 Units, Subcutaneous, Q8H, Elias Cash DO, 5,000 Units at 03/17/25 1336    nitroglycerin (NITROSTAT) SL tablet 0.4 mg, 0.4 mg, Sublingual, Q5 Min PRN, Elias Cash DO    Current Outpatient Medications:     hydroCHLOROthiazide 12.5 MG tablet, Take 1 tablet by mouth Daily., Disp: , Rfl:     lisinopril (PRINIVIL,ZESTRIL) 10 MG tablet, Take 1 tablet by mouth Daily., Disp: , Rfl:     omeprazole (priLOSEC) 20 MG capsule, Take 1 capsule by mouth Daily., Disp: , Rfl:     acetaminophen (TYLENOL) 500  "MG tablet, Take 1 tablet by mouth., Disp: , Rfl:     Allergies:   No Known Allergies    Physical Exam:  Vital Signs:   Vitals:    03/17/25 1346 03/17/25 1401 03/17/25 1416 03/17/25 1418   BP: 110/79 120/87 110/74 116/82   BP Location:       Patient Position:       Pulse: 82 82 84    Resp:       Temp:       TempSrc:       SpO2: 96% 94% 95%    Weight:    116 kg (256 lb)   Height:    167.6 cm (66\")       Physical Exam  Constitutional:       General: He is not in acute distress.     Appearance: He is obese. He is not diaphoretic.   HENT:      Head: Normocephalic and atraumatic.   Cardiovascular:      Rate and Rhythm: Normal rate and regular rhythm.      Heart sounds: No murmur heard.  Pulmonary:      Effort: Pulmonary effort is normal. No respiratory distress.      Breath sounds: No stridor. No wheezing, rhonchi or rales.      Comments: Scattered crackles bilaterally  Abdominal:      General: Bowel sounds are normal. There is no distension.      Palpations: Abdomen is soft.      Tenderness: There is no abdominal tenderness. There is no guarding or rebound.   Musculoskeletal:         General: Swelling present. Normal range of motion.      Cervical back: Neck supple. No tenderness.   Skin:     General: Skin is warm and dry.   Neurological:      General: No focal deficit present.      Mental Status: He is alert and oriented to person, place, and time.   Psychiatric:         Mood and Affect: Mood normal.         Behavior: Behavior normal.         Results Review:   Results from last 7 days   Lab Units 03/17/25  0628   SODIUM mmol/L 139   POTASSIUM mmol/L 4.0   CHLORIDE mmol/L 105   CO2 mmol/L 24.4   BUN mg/dL 16   CREATININE mg/dL 1.16   CALCIUM mg/dL 8.7   BILIRUBIN mg/dL 0.9   ALK PHOS U/L 80   ALT (SGPT) U/L 43*   AST (SGOT) U/L 46*   GLUCOSE mg/dL 98     Results from last 7 days   Lab Units 03/17/25  0729 03/17/25  0628   HSTROP T ng/L 84* 77*     Results from last 7 days   Lab Units 03/17/25  0628   WBC 10*3/mm3 9.14 "   HEMOGLOBIN g/dL 14.7   HEMATOCRIT % 44.2   PLATELETS 10*3/mm3 298                   I personally viewed and interpreted the patient's EKG/Telemetry data     Assessment:  1.  Acute systolic heart failure  2.  Elevated troponin level  3.  Hypertension  4.  Obesity, BMI 41    Plan:  1.  Acute systolic heart failure  --Echocardiogram shows LVEF 35-40%, moderate global LV hypokinesis  -- Several potential etiologies, including ischemic cardiomyopathy  -- Continue Lasix for diuresis  --Agree with carvedilol  -- As volume status improves, will start Entresto  -- SGLT2 inhibitor on discharge  -- Discussed the risks and benefits of coronary angiogram to rule out ischemic cardiomyopathy, the patient is agreeable to proceed  -- N.p.o. after midnight with plans for an angiogram tomorrow morning    2.  Elevated troponin level  -- No ischemic changes on ECG  -- No current anginal symptoms  -- Suspect troponin elevation likely demand ischemia, lower suspicion for ACS  -- No need to continue trending troponins  -- Coronary angiogram tomorrow morning, as above    Thank you for allowing me to participate in the care of your patient. Please do not hesitate to contact me with additional questions or concerns.     LYNN Rainey MD  Interventional Cardiology   03/17/25  14:44 EDT

## 2025-03-17 NOTE — H&P
[facility]   HISTORY AND PHYSICAL    Patient Name: Kenji Pizarro  : 1995  MRN: [id number]  Primary Care Physician:  Provider, No Known  Date of admission: 3/17/2025    Subjective   Subjective     Chief Complaint: Chest pain    History of Present Illness  Patient is a 29-year-old male with past medical history of hypertension and GERD.  Patient presents to the emergency department brought in initially because of the nosebleed with some blood coming from his mouth that awoke him from sleep.  Those were stopped before even making it into the ED however he did have some midsternal chest pain with radiation into his back and shoulder.  Patient denies significant shortness of breath but does have edema of his lower extremities bilaterally.  No history of CHF.  On physical exam he does have some crackles in his lungs and he has been tachycardic with a greatly elevated blood pressure during his ER stay.  The patient's troponin levels have trended upwards but are only mildly elevated.  The case was discussed with cardiology who recommended that the patient be admitted to continue trending troponins but not to start anticoagulation just yet.  Review of Systems   As stated above in his present illness all other systems were reviewed and subsequently negative  Personal History     Past medical history  Hypertension  GERD    History reviewed. No pertinent surgical history.    Family History: family history includes Diabetes in his brother and mother; Hyperlipidemia in his father; Hypertension in his mother; No Known Problems in his brother, sister, sister, and son. Otherwise pertinent FHx was reviewed and not pertinent to current issue.    Social History:  reports that he has been smoking cigarettes. He has never been exposed to tobacco smoke. He has never used smokeless tobacco. Alcohol use questions deferred to the physician. He reports that he does not use drugs.    Home Medications:  acetaminophen,  hydroCHLOROthiazide, lisinopril, and omeprazole    Allergies:  No Known Allergies    Objective    Objective     Vitals:   Temp:  [98.2 °F (36.8 °C)] 98.2 °F (36.8 °C)  Heart Rate:  [101-113] 105  Resp:  [20] 20  BP: (157-182)/(118-144) 157/118    Physical Exam  Constitutional:  Well-developed and well-nourished.  No acute distress.      HENT:  Head:  Normocephalic and atraumatic.  Mouth:  Moist mucous membranes.    Eyes:  Conjunctivae and EOM are normal. No scleral icterus.    Neck:  Neck supple.  No JVD present.    Cardiovascular:  Normal rate, regular rhythm and normal heart sounds with no murmur.  Pulmonary/Chest:  No respiratory distress, no wheezes, no crackles, with normal breath sounds and good air movement.  Abdominal:  Soft. No distension and no tenderness.   Musculoskeletal:  No tenderness, and no deformity.  No red or swollen joints anywhere.    Neurological:  Alert and oriented to person, place, and time.  No cranial nerve deficit.    Skin:  Skin is warm and dry. No rash noted. No pallor.   Peripheral vascular:  No clubbing, no cyanosis, no edema.  Psychiatric: Appropriate mood and affect  :    Result Review    Result Review:  I have personally reviewed the results from the time of this admission to 3/17/2025 09:35 EDT and agree with these findings:  [x]  Laboratory list / accordion  []  Microbiology  [x]  Radiology  []  EKG/Telemetry   []  Cardiology/Vascular   []  Pathology  []  Old records  []  Other:  Most notable findings include:    High Sensitivity Troponin T 1Hr  Collected: 03/17/25 0729  Final result  Specimen: Blood     HS Troponin T 84 High Critical  ng/L Troponin T % Delta 9   Troponin T Numeric Delta 7 ng/L            03/17/25 0714  BNP  Collected: 03/17/25 0628  Final result  Specimen: Blood    proBNP 2,235.0 High  pg/mL            03/17/25 0712  High Sensitivity Troponin T  Collected: 03/17/25 0628  Final result  Specimen: Blood    HS Troponin T 77 High Critical  ng/L             03/17/25 0710  Urinalysis, Microscopic Only - Urine, Clean Catch  Collected: 03/17/25 0629  Final result  Specimen: Urine, Clean Catch    RBC, UA 0-2 /HPF Squamous Epithelial Cells, UA 0-2 /HPF   WBC, UA 6-10 Abnormal  /HPF Hyaline Casts, UA None Seen /LPF   Bacteria, UA Trace Abnormal  /HPF Methodology Manual Light Microscopy          03/17/25 0658  Comprehensive Metabolic Panel  Collected: 03/17/25 0628  Final result  Specimen: Blood    Glucose 98 mg/dL ALT (SGPT) 43 High  U/L   BUN 16 mg/dL AST (SGOT) 46 High  U/L    Creatinine 1.16 mg/dL Alkaline Phosphatase 80 U/L   Sodium 139 mmol/L Total Bilirubin 0.9 mg/dL   Potassium 4.0 mmol/L  Globulin 3.0 gm/dL   Chloride 105 mmol/L A/G Ratio 1.3 g/dL   CO2 24.4 mmol/L BUN/Creatinine Ratio 13.8   Calcium 8.7 mg/dL Anion Gap 9.6 mmol/L   Total Protein 6.8 g/dL eGFR 86.4 mL/min/1.73   Albumin 3.8 g/dL            03/17/25 0653  Urinalysis With Microscopic If Indicated (No Culture) - Urine, Clean Catch  Collected: 03/17/25 0629  Final result  Specimen: Urine, Clean Catch    Color, UA Dark Yellow Abnormal  Bilirubin, UA Small (1+) Abnormal    Appearance, UA Clear Blood, UA Negative   pH, UA 7.0 Protein, UA >=300 mg/dL (3+) Abnormal    Specific Gravity, UA 1.029 Leuk Esterase, UA Small (1+) Abnormal    Glucose, UA Negative Nitrite, UA Negative   Ketones, UA Trace Abnormal  Urobilinogen, UA 1.0 E.U./dL          03/17/25 0637  CBC & Differential  Collected: 03/17/25 0628  Final result  Specimen: Blood           03/17/25 0637  CBC Auto Differential  Collected: 03/17/25 0628  Final result  Specimen: Blood    WBC 9.14 10*3/mm3 Lymphocyte % 21.0 %   RBC 5.22 10*6/mm3 Monocyte % 7.3 %   Hemoglobin 14.7 g/dL Eosinophil % 2.5 %   Hematocrit 44.2 % Basophil % 1.0 %   MCV 84.7 fL Immature Grans % 0.2 %   MCH 28.2 pg Neutrophils, Absolute 6.21 10*3/mm3   MCHC 33.3 g/dL Lymphocytes, Absolute 1.92 10*3/mm3   RDW 13.5 % Monocytes, Absolute 0.67 10*3/mm3   RDW-SD 41.4 fl  Eosinophils, Absolute 0.23 10*3/mm3   MPV 10.5 fL Basophils, Absolute 0.09 10*3/mm3   Platelets 298 10*3/mm3 Immature Grans, Absolute 0.02 10*3/mm3   Neutrophil % 68.0 % nRBC 0.0 /100 WBC          Assessment & Plan   Assessment / Plan     Brief Patient Summary:  Kenji Pizarro is a 29 y.o. male who presents to the emergency department complaining of chest pain and nosebleeds.  His blood pressure was found to be greatly elevated    Active Hospital Problems:  Presumed congestive heart failure NOS-POA  Accelerated hypertension  Chest pain with elevated troponin rule out NSTEMI type I versus type II  - Patient will be admitted to the hospital service  - We will continue to trend troponins  - Have ordered a 2D echo for better evaluation of cardiac output  - Cardiology has been consulted and recommended carvedilol for both blood pressure control and for presumed congestive heart failure  - Strict I's and O's  - We will keep him on telemetry for closer monitoring  - We will diurese with Lasix 80 mg IV twice daily  - Cardiac diet  - I will defer to cardiology for definitive testing for possible CAD    Transaminitis  - Ultrasound right upper quadrant  - GGT  - We will get a more detailed history of alcohol use        VTE Prophylaxis:  No VTE prophylaxis order currently exists.        CODE STATUS:       Admission Status:  I believe this patient meets inpatient status.    Elias Cash,

## 2025-03-17 NOTE — ED NOTES
[facility] contacted for a consult per Dr. Conteh, awaiting a call back from Dr. Wayne at this time.

## 2025-03-17 NOTE — ED NOTES
[facility]iology contacted for a consult per Dr. Conteh. Dr. Rainey, cardiologist, on the line to speak with our provider. Call transferred to MD 1 phone.

## 2025-03-17 NOTE — CONSULTS
[facility]      Nephrology Consultation      Referring Provider:   No ref. provider found    Reason for Consultation:  Hypertensive urgency and proteinuria.    Subjective:  Chief complaint   Chief Complaint   Patient presents with    Nose Bleed     History of present illness:    Patient is 29-year-old male who is morbidly obese presented with nosebleed this morning, he woke up around 4 AM and noticed that he was having a nosebleed throughout the blood coming out of his mouth.  He also reported some chest pain but it was nonradiating.  Denies any significant cough or sputum production no fever or chills denies any sick contacts.  In the ER he was noted to have very high blood pressure, initial blood pressure was 182/133, he was started on IV medications, noted to have significantly enlarged heart.  Patient had a CT of the chest with contrast to rule out PE it was reported as negative.  Increased troponin, cardiology was consulted and patient was admitted through the hospitalist service for further evaluation and treatment.  I was consulted as patient noted to have significant proteinuria and a spot UA as well as edema.  When I saw the patient was laying in the bed blood pressure was much better controlled after he received all the medications as well as diuretics.  He did not have any symptoms there was no nosebleed.  He does not follow-up with any doctor but has taken any medicines.  He denies taking any nonsteroidals and no family history of renal disease.  I have reviewed labs/imaging/records from this hospitalization, including ER staff and admitting/attending physicians H/P's and progress notes to establish a comprehensive understanding of this patient's clinical hospital course, as well as to establish plan of care appropriately.     History reviewed. No pertinent past medical history.    History reviewed. No pertinent surgical history.  Family History   Problem Relation Age of Onset     "Hyperlipidemia Father     Hypertension Mother     Diabetes Mother     No Known Problems Sister     No Known Problems Sister     Diabetes Brother     No Known Problems Brother     No Known Problems Son      negative h/o ESRD     Social History     Tobacco Use    Smoking status: Some Days     Types: Cigarettes     Passive exposure: Never    Smokeless tobacco: Never    Tobacco comments:     1 cigarette every 2-3 months   Vaping Use    Vaping status: Never Used    Passive vaping exposure: Yes   Substance Use Topics    Alcohol use: Defer    Drug use: Never     Home medications:   Prior to Admission Medications       Prescriptions Last Dose Informant Patient Reported? Taking?    hydroCHLOROthiazide 12.5 MG tablet   Yes Yes    Take 1 tablet by mouth Daily.    lisinopril (PRINIVIL,ZESTRIL) 10 MG tablet   Yes Yes    Take 1 tablet by mouth Daily.    omeprazole (priLOSEC) 20 MG capsule   Yes Yes    Take 1 capsule by mouth Daily.    acetaminophen (TYLENOL) 500 MG tablet   Yes No    Take 1 tablet by mouth.          Emergency department medications:   Medications   furosemide (LASIX) injection 80 mg (80 mg Intravenous Given 3/17/25 0810)   iopamidol (ISOVUE-300) 61 % injection 100 mL (100 mL Intravenous Given 3/17/25 0812)   carvedilol (COREG) tablet 25 mg (25 mg Oral Given 3/17/25 0938)   labetalol (NORMODYNE,TRANDATE) injection 10 mg (10 mg Intravenous Given 3/17/25 1029)       Allergies:  Patient has no known allergies.    Review of Systems  14 point review of system were done and are negative except as mentioned in the history of present illness and assessment and plan.      Physical Exam:  Objective:  Vital Signs  BP (!) 158/114   Pulse 109   Temp 98.2 °F (36.8 °C) (Oral)   Resp 20   Ht 167.6 cm (66\")   Wt 116 kg (256 lb)   SpO2 97%   BMI 41.32 kg/m²   Objective    I/O this shift:  In: -   Out: 2575 [Urine:2575]    Intake/Output Summary (Last 24 hours) at 3/17/2025 1102  Last data filed at 3/17/2025 1033  Gross per 24 " "hour   Intake --   Output 2575 ml   Net -2575 ml        Physical Exam     Constitutional: Awake, alert  Eyes: sclerae anicteric, no conjunctival injection  HEENT: mucous membranes dry  Neck: Supple, no thyromegaly, no lymphadenopathy, trachea midline, No JVD  Respiratory: Clear to auscultation bilaterally, nonlabored respirations   Cardiovascular: RRR, no murmurs, rubs, or gallops.  Gastrointestinal: Positive bowel sounds, obese, soft, nontender, nondistended  Musculoskeletal: 1-2+ edema, no clubbing or cyanosis  Psychiatric: Appropriate affect, cooperative  Neurologic: Oriented x 3, moving all extremities, Cranial Nerves grossly intact, speech clear  Skin: warm and dry, no rashes            Results Review:   Results from last 7 days   Lab Units 03/17/25  0628   SODIUM mmol/L 139   POTASSIUM mmol/L 4.0   CHLORIDE mmol/L 105   CO2 mmol/L 24.4   BUN mg/dL 16   CREATININE mg/dL 1.16   CALCIUM mg/dL 8.7   ALBUMIN g/dL 3.8   BILIRUBIN mg/dL 0.9   ALK PHOS U/L 80   ALT (SGPT) U/L 43*   AST (SGOT) U/L 46*   GLUCOSE mg/dL 98     Estimated Creatinine Clearance: 110.5 mL/min (by C-G formula based on SCr of 1.16 mg/dL).          Results from last 7 days   Lab Units 03/17/25  0628   WBC 10*3/mm3 9.14   HEMOGLOBIN g/dL 14.7   PLATELETS 10*3/mm3 298         Brief Urine Lab Results  (Last result in the past 365 days)        Color   Clarity   Blood   Leuk Est   Nitrite   Protein   CREAT   Urine HCG        03/17/25 0629 Dark Yellow   Clear   Negative   Small (1+)   Negative   >=300 mg/dL (3+)                 No results found for: \"UTPCR\"  Imaging Results (Last 24 Hours)       Procedure Component Value Units Date/Time    US Venous Doppler Lower Extremity Bilateral (duplex) [[id number]] Collected: 03/17/25 0859     Updated: 03/17/25 0902    Narrative:      BILATERAL LOWER EXTREMITY VENOUS DUPLEX DOPPLER EXAMINATION     HISTORY: edema; I50.9-Heart failure, unspecified; R09.02-Hypoxemia;  R60.0-Localized edema; R79.89-Other specified " abnormal findings of blood  chemistry     COMPARISON:   None     PROCEDURE: Multiple transverse and longitudinal scans were performed of  both femoropopliteal deep venous system, with augmentation and  compression maneuvers.     FINDINGS: Proper phasic flow was noted in the visualized deep venous  system. No intraluminal increased echogenicity is noted to suggest  thrombus. There is proper compression and augmentation of the venous  structures. No abnormal venous collaterals are seen.       Impression:      No evidence of left or right lower extremity deep venous  thrombosis.                 This report was signed and finalized on 3/17/2025 9:00 AM by Kelsey Rosenthal MD.       CT Angiogram Chest [[id number]] Collected: 03/17/25 0826     Updated: 03/17/25 0829    Narrative:      PROCEDURE: CT ANGIOGRAM CHEST-     HISTORY: dyspnea/chest pain/hypoxia; I50.9-Heart failure, unspecified;  R09.02-Hypoxemia; R60.0-Localized edema; R79.89-Other specified abnormal  findings of blood chemistry, shortness of breath     COMPARISON: None.     TECHNIQUE: The patient was injected with  IV contrast. Axial images were  obtained through the chest in a CTA/ PE protocol. 3 D Reconstruction  images were also performed. This study was performed with techniques to  keep radiation doses as low as reasonably achievable, (ALARA).  Individualized dose reduction techniques using automated exposure  control or adjustment of mA and/or kV according to the patient size were  employed.     FINDINGS: There is no axillary adenopathy. There is no hilar or  mediastinal adenopathy.  The heart is mildly enlarged, unusual for the  patient's age. There also appears to be mild dilatation of the left  ventricle. There is a small pericardial effusion and minimal right  pleural effusion. Reflux of contrast into the IVC and hepatic veins  noted suggesting cardiac dysfunction. No filling defects are identified  to suggest PE. There is no evidence of thoracic aortic  aneurysm or  dissection. Limited images of the upper abdomen are unremarkable. No  infiltrate identified. There is a small area of focal pleural thickening  posterior medial right lower lobe best seen series 4 image 147 and  series 5 image 267. Interlobular septal thickening identified laterally  in the left lower lobe.       Impression:      No evidence of aortic aneurysm, dissection or pulmonary  embolism.      Cardiomegaly with small pericardial and right pleural effusions and  additional findings suggesting some degree of cardiac dysfunction;  further evaluation recommended.        CTDI: 6.34 mGy  DLP:250.43 mGy.cm     This report was signed and finalized on 3/17/2025 8:27 AM by Kelsey Rosenthal MD.       XR Chest 1 View [[id number]] Collected: 03/17/25 0735     Updated: 03/17/25 0738    Narrative:      PROCEDURE: XR CHEST 1 VW-     HISTORY: chest pain, epigastric pain and lower chest pain for 1 week.     COMPARISON: None.     FINDINGS: The heart is enlarged with pulmonary vascular congestion.. The  lungs are clear. The mediastinum is unremarkable. There is no  pneumothorax.  There are no acute osseous abnormalities. Apical lordotic  positioning noted.        Impression:      Cardiomegaly and pulmonary vascular congestion, consider  early CHF..              This report was signed and finalized on 3/17/2025 7:36 AM by Kelsey Rosenthal MD.               No current facility-administered medications for this encounter.      Assessment/Plan:      CHF (congestive heart failure)    Hypertensive urgency: Blood pressure is much better controlled after starting oral medications.    Proteinuria: Check spot protein creatinine ratio if it is nephrotic and we will have to do more aggressive workup to rule out primary as otherwise it may be secondary to uncontrolled hypertension may improve once optimizing the blood pressure control and on blood pressure medications.  Congestive heart failure: Patient received multiple doses of  diuretics, echo has been ordered.  Morbid obesity: Complicates all forms of care.      Risk and complexity: High      Plan:  Check spot protein creatinine ratio and renal ultrasound.  Continue to optimize medications for better blood pressure control.  Cardiac consult noted and further workup is in progress.  Continue with rest of the current treatment plan and surveillance labs.  Details were discussed with the patient no family in the room.    Details were also discussed with the hospitalist service.   Further recommendations will depend on clinical course of the patient during the current hospitalization.    I also discussed the details with the nursing staff.  Rest as ordered.    In closing, I sincerely appreciate opportunity to participate in care of this patient. If I can be of any further assistance with the management of this patient, please don’t hesitate to contact me.    Vivek Mcgowan MD, JUAN RAMON    03/17/25  11:02 EDT    Dictated using Dragon.

## 2025-03-18 LAB
ANION GAP SERPL CALCULATED.3IONS-SCNC: 10.3 MMOL/L (ref 5–15)
BUN SERPL-MCNC: 20 MG/DL (ref 6–20)
BUN/CREAT SERPL: 17.1 (ref 7–25)
CALCIUM SPEC-SCNC: 8.5 MG/DL (ref 8.6–10.5)
CHLORIDE SERPL-SCNC: 103 MMOL/L (ref 98–107)
CO2 SERPL-SCNC: 26.7 MMOL/L (ref 22–29)
CREAT SERPL-MCNC: 1.17 MG/DL (ref 0.76–1.27)
DEPRECATED RDW RBC AUTO: 43.5 FL (ref 37–54)
EGFRCR SERPLBLD CKD-EPI 2021: 85.5 ML/MIN/1.73
ERYTHROCYTE [DISTWIDTH] IN BLOOD BY AUTOMATED COUNT: 13.8 % (ref 12.3–15.4)
GLUCOSE SERPL-MCNC: 104 MG/DL (ref 65–99)
HCT VFR BLD AUTO: 44 % (ref 37.5–51)
HGB BLD-MCNC: 14.3 G/DL (ref 13–17.7)
MAGNESIUM SERPL-MCNC: 2.2 MG/DL (ref 1.6–2.6)
MCH RBC QN AUTO: 28 PG (ref 26.6–33)
MCHC RBC AUTO-ENTMCNC: 32.5 G/DL (ref 31.5–35.7)
MCV RBC AUTO: 86.3 FL (ref 79–97)
PLATELET # BLD AUTO: 267 10*3/MM3 (ref 140–450)
PMV BLD AUTO: 10.2 FL (ref 6–12)
POTASSIUM SERPL-SCNC: 3.9 MMOL/L (ref 3.5–5.2)
RBC # BLD AUTO: 5.1 10*6/MM3 (ref 4.14–5.8)
SODIUM SERPL-SCNC: 140 MMOL/L (ref 136–145)
WBC NRBC COR # BLD AUTO: 9.6 10*3/MM3 (ref 3.4–10.8)

## 2025-03-18 PROCEDURE — B2111ZZ FLUOROSCOPY OF MULTIPLE CORONARY ARTERIES USING LOW OSMOLAR CONTRAST: ICD-10-PCS | Performed by: INTERNAL MEDICINE

## 2025-03-18 PROCEDURE — 25010000002 LIDOCAINE 1 % SOLUTION: Performed by: INTERNAL MEDICINE

## 2025-03-18 PROCEDURE — C1769 GUIDE WIRE: HCPCS | Performed by: INTERNAL MEDICINE

## 2025-03-18 PROCEDURE — 25010000002 HEPARIN (PORCINE) PER 1000 UNITS: Performed by: INTERNAL MEDICINE

## 2025-03-18 PROCEDURE — 94660 CPAP INITIATION&MGMT: CPT

## 2025-03-18 PROCEDURE — C1894 INTRO/SHEATH, NON-LASER: HCPCS | Performed by: INTERNAL MEDICINE

## 2025-03-18 PROCEDURE — 25010000002 MIDAZOLAM PER 1MG: Performed by: INTERNAL MEDICINE

## 2025-03-18 PROCEDURE — 85027 COMPLETE CBC AUTOMATED: CPT | Performed by: FAMILY MEDICINE

## 2025-03-18 PROCEDURE — 80048 BASIC METABOLIC PNL TOTAL CA: CPT | Performed by: FAMILY MEDICINE

## 2025-03-18 PROCEDURE — 94799 UNLISTED PULMONARY SVC/PX: CPT

## 2025-03-18 PROCEDURE — 25010000002 FENTANYL CITRATE (PF) 50 MCG/ML SOLUTION: Performed by: INTERNAL MEDICINE

## 2025-03-18 PROCEDURE — 99232 SBSQ HOSP IP/OBS MODERATE 35: CPT | Performed by: STUDENT IN AN ORGANIZED HEALTH CARE EDUCATION/TRAINING PROGRAM

## 2025-03-18 PROCEDURE — 25010000002 FUROSEMIDE PER 20 MG: Performed by: STUDENT IN AN ORGANIZED HEALTH CARE EDUCATION/TRAINING PROGRAM

## 2025-03-18 PROCEDURE — 25510000001 IOPAMIDOL PER 1 ML: Performed by: INTERNAL MEDICINE

## 2025-03-18 PROCEDURE — 4A023N7 MEASUREMENT OF CARDIAC SAMPLING AND PRESSURE, LEFT HEART, PERCUTANEOUS APPROACH: ICD-10-PCS | Performed by: INTERNAL MEDICINE

## 2025-03-18 PROCEDURE — 25010000002 FUROSEMIDE PER 20 MG: Performed by: INTERNAL MEDICINE

## 2025-03-18 PROCEDURE — 25010000002 HEPARIN (PORCINE) PER 1000 UNITS: Performed by: STUDENT IN AN ORGANIZED HEALTH CARE EDUCATION/TRAINING PROGRAM

## 2025-03-18 PROCEDURE — 93458 L HRT ARTERY/VENTRICLE ANGIO: CPT | Performed by: INTERNAL MEDICINE

## 2025-03-18 PROCEDURE — 83735 ASSAY OF MAGNESIUM: CPT | Performed by: FAMILY MEDICINE

## 2025-03-18 RX ORDER — FUROSEMIDE 10 MG/ML
40 INJECTION INTRAMUSCULAR; INTRAVENOUS ONCE
Status: COMPLETED | OUTPATIENT
Start: 2025-03-18 | End: 2025-03-18

## 2025-03-18 RX ORDER — ACETAMINOPHEN 325 MG/1
650 TABLET ORAL EVERY 4 HOURS PRN
Status: CANCELLED | OUTPATIENT
Start: 2025-03-18

## 2025-03-18 RX ORDER — IOPAMIDOL 755 MG/ML
INJECTION, SOLUTION INTRAVASCULAR
Status: DISCONTINUED | OUTPATIENT
Start: 2025-03-18 | End: 2025-03-18 | Stop reason: HOSPADM

## 2025-03-18 RX ORDER — LIDOCAINE HYDROCHLORIDE 10 MG/ML
INJECTION, SOLUTION INFILTRATION; PERINEURAL
Status: DISCONTINUED | OUTPATIENT
Start: 2025-03-18 | End: 2025-03-18 | Stop reason: HOSPADM

## 2025-03-18 RX ORDER — HYDROCHLOROTHIAZIDE 25 MG/1
25 TABLET ORAL
Status: DISCONTINUED | OUTPATIENT
Start: 2025-03-18 | End: 2025-03-19 | Stop reason: HOSPADM

## 2025-03-18 RX ORDER — HEPARIN SODIUM 1000 [USP'U]/ML
INJECTION, SOLUTION INTRAVENOUS; SUBCUTANEOUS
Status: DISCONTINUED | OUTPATIENT
Start: 2025-03-18 | End: 2025-03-18 | Stop reason: HOSPADM

## 2025-03-18 RX ORDER — MIDAZOLAM HYDROCHLORIDE 2 MG/2ML
INJECTION, SOLUTION INTRAMUSCULAR; INTRAVENOUS
Status: DISCONTINUED | OUTPATIENT
Start: 2025-03-18 | End: 2025-03-18 | Stop reason: HOSPADM

## 2025-03-18 RX ORDER — HEPARIN SODIUM 5000 [USP'U]/ML
5000 INJECTION, SOLUTION INTRAVENOUS; SUBCUTANEOUS EVERY 8 HOURS SCHEDULED
Status: DISCONTINUED | OUTPATIENT
Start: 2025-03-18 | End: 2025-03-19 | Stop reason: HOSPADM

## 2025-03-18 RX ORDER — LOSARTAN POTASSIUM 25 MG/1
100 TABLET ORAL
Status: DISCONTINUED | OUTPATIENT
Start: 2025-03-18 | End: 2025-03-19 | Stop reason: HOSPADM

## 2025-03-18 RX ORDER — METOPROLOL SUCCINATE 25 MG/1
50 TABLET, EXTENDED RELEASE ORAL
Status: DISCONTINUED | OUTPATIENT
Start: 2025-03-18 | End: 2025-03-19 | Stop reason: HOSPADM

## 2025-03-18 RX ORDER — VERAPAMIL HYDROCHLORIDE 2.5 MG/ML
INJECTION, SOLUTION INTRAVENOUS
Status: DISCONTINUED | OUTPATIENT
Start: 2025-03-18 | End: 2025-03-18 | Stop reason: HOSPADM

## 2025-03-18 RX ORDER — FUROSEMIDE 10 MG/ML
40 INJECTION INTRAMUSCULAR; INTRAVENOUS
Status: DISCONTINUED | OUTPATIENT
Start: 2025-03-18 | End: 2025-03-19

## 2025-03-18 RX ORDER — NITROGLYCERIN 0.4 MG/1
0.4 TABLET SUBLINGUAL
Status: CANCELLED | OUTPATIENT
Start: 2025-03-18

## 2025-03-18 RX ORDER — FENTANYL CITRATE 50 UG/ML
INJECTION, SOLUTION INTRAMUSCULAR; INTRAVENOUS
Status: DISCONTINUED | OUTPATIENT
Start: 2025-03-18 | End: 2025-03-18 | Stop reason: HOSPADM

## 2025-03-18 RX ADMIN — FUROSEMIDE 40 MG: 10 INJECTION, SOLUTION INTRAMUSCULAR; INTRAVENOUS at 18:21

## 2025-03-18 RX ADMIN — FUROSEMIDE 40 MG: 10 INJECTION, SOLUTION INTRAMUSCULAR; INTRAVENOUS at 17:03

## 2025-03-18 RX ADMIN — LOSARTAN POTASSIUM 100 MG: 25 TABLET, FILM COATED ORAL at 10:12

## 2025-03-18 RX ADMIN — METOPROLOL SUCCINATE 50 MG: 25 TABLET, EXTENDED RELEASE ORAL at 10:12

## 2025-03-18 RX ADMIN — HYDROCHLOROTHIAZIDE 25 MG: 25 TABLET ORAL at 10:12

## 2025-03-18 RX ADMIN — HEPARIN SODIUM 5000 UNITS: 5000 INJECTION INTRAVENOUS; SUBCUTANEOUS at 21:05

## 2025-03-18 NOTE — PLAN OF CARE
Phase 1 - Admission/Acute - Current (Heart Failure Pathway)  Output is greater than intake.  Outcome: Met  Patient's oxygen saturation maintained above 90% unless otherwise specified.  Outcome: Met  Patient reports improvement in symptoms since arrival.  Outcome: Met  An increase-progression in activity with patient's baseline in mind. Patient performing daily AMPAC-6 goal.  Outcome: Met  Initiate Guideline Directed Medical Therapy (ex.ACE, ARBs, ARNI, Beta Blockers, SGLT2 Inhibitors).  Outcome: Met   Goal Outcome Evaluation:

## 2025-03-18 NOTE — PROGRESS NOTES
[facility] Cardiology IP Progress Note    Kenji Pizarro  1995  [id number]  03/18/25    Subjective:   Mr. Kenji Pizarro is a 29 y.o. male seen in follow-up for acute systolic heart failure.  No acute overnight events.  On review of ins and outs, -2.3 L for length of stay.  This morning, the patient reports improvement in his shortness of breath, however remains dyspneic above baseline with mild orthopnea.  He has been better controlled.  No new complaints.    Review of Systems:   Review of Systems   Constitutional:  Negative for activity change, appetite change, chills, diaphoresis, fatigue, fever, unexpected weight gain and unexpected weight loss.   Respiratory:  Positive for shortness of breath. Negative for cough, chest tightness and wheezing.    Cardiovascular:  Negative for chest pain, palpitations and leg swelling.   Gastrointestinal:  Negative for abdominal pain, anal bleeding, blood in stool and GERD.   Neurological:  Negative for dizziness, syncope, weakness and light-headedness.   Hematological:  Does not bruise/bleed easily.   Psychiatric/Behavioral:  Negative for depressed mood and stress. The patient is not nervous/anxious.        I have reviewed and/or updated the patient's past medical, past surgical, family history, social history, problem list and allergies as appropriate in the chart.     Physical Exam:  Vital Signs:   Vitals:    03/18/25 1012 03/18/25 1034 03/18/25 1034 03/18/25 1103   BP: (!) 138/103  (!) 154/111 134/98   BP Location:       Patient Position:       Pulse: 97  98 93   Resp:       Temp:       TempSrc:       SpO2:  98% 98% 98%   Weight:       Height:           Physical Exam  Vitals and nursing note reviewed.   Constitutional:       General: He is not in acute distress.     Appearance: He is obese. He is not diaphoretic.   HENT:      Head: Normocephalic and atraumatic.   Cardiovascular:      Rate and Rhythm: Normal rate and regular rhythm.      Heart sounds: No  murmur heard.  Pulmonary:      Effort: Pulmonary effort is normal. No respiratory distress.      Breath sounds: Normal breath sounds. No stridor. No wheezing, rhonchi or rales.   Abdominal:      General: Bowel sounds are normal. There is no distension.      Palpations: Abdomen is soft.      Tenderness: There is no abdominal tenderness. There is no guarding or rebound.   Musculoskeletal:         General: Swelling present. Normal range of motion.      Cervical back: Neck supple. No tenderness.   Skin:     General: Skin is warm and dry.   Neurological:      General: No focal deficit present.      Mental Status: He is alert and oriented to person, place, and time.   Psychiatric:         Mood and Affect: Mood normal.         Behavior: Behavior normal.         Results Review:   Results from last 7 days   Lab Units 03/18/25  0646 03/17/25  0628   SODIUM mmol/L 140 139   POTASSIUM mmol/L 3.9 4.0   CHLORIDE mmol/L 103 105   CO2 mmol/L 26.7 24.4   BUN mg/dL 20 16   CREATININE mg/dL 1.17 1.16   CALCIUM mg/dL 8.5* 8.7   BILIRUBIN mg/dL  --  0.9   ALK PHOS U/L  --  80   ALT (SGPT) U/L  --  43*   AST (SGOT) U/L  --  46*   GLUCOSE mg/dL 104* 98     Results from last 7 days   Lab Units 03/17/25  0729 03/17/25  0628   HSTROP T ng/L 84* 77*     Results from last 7 days   Lab Units 03/18/25  0646 03/17/25  0628   WBC 10*3/mm3 9.60 9.14   HEMOGLOBIN g/dL 14.3 14.7   HEMATOCRIT % 44.0 44.2   PLATELETS 10*3/mm3 267 298         Results from last 7 days   Lab Units 03/18/25  0646   MAGNESIUM mg/dL 2.2           I personally viewed and interpreted the patient's EKG/Telemetry data     Medications:   )  Current Facility-Administered Medications:     fentaNYL citrate (PF) (SUBLIMAZE) injection, , , Code / Trauma / Sedation Medication, Jose Rainey MD, 50 mcg at 03/18/25 1039    [Held by provider] heparin (porcine) 5000 UNIT/ML injection 5,000 Units, 5,000 Units, Subcutaneous, Q8H, Elias Cash DO, 5,000 Units at 03/17/25 5493     heparin (porcine) injection, , , Code / Trauma / Sedation Medication, Jose Rainey MD, 2,000 Units at 03/18/25 1046    [Transfer Hold] losartan (COZAAR) tablet 100 mg, 100 mg, Oral, Q24H, 100 mg at 03/18/25 1012 **AND** [Transfer Hold] hydroCHLOROthiazide tablet 25 mg, 25 mg, Oral, Q24H, Vivek Mcgowan MD, FASN, 25 mg at 03/18/25 1012    iopamidol (ISOVUE-370) 76 % injection, , , Code / Trauma / Sedation Medication, Jose Rainey MD, 40 mL at 03/18/25 1100    lidocaine (XYLOCAINE) 1 % injection, , , Code / Trauma / Sedation Medication, Jose Rainey MD, 3 mL at 03/18/25 1043    metoprolol succinate XL (TOPROL-XL) 24 hr tablet 50 mg, 50 mg, Oral, Q24H, Vivek Mcgowan MD, FASN, 50 mg at 03/18/25 1012    Midazolam HCl (PF) (VERSED) injection, , , Code / Trauma / Sedation Medication, Jose Rainey MD, 1 mg at 03/18/25 1039    nitroglycerin (NITROSTAT) SL tablet 0.4 mg, 0.4 mg, Sublingual, Q5 Min PRN, Elias Cash,     nitroglycerin 100 mcg/mL in D5W syringe, , , Code / Trauma / Sedation Medication, Jose Rainey MD, 200 mcg at 03/18/25 1046    verapamil (ISOPTIN) injection, , , Code / Trauma / Sedation Medication, Jose Rainey MD, 2.5 mg at 03/18/25 1046    Assessment:  1.  Acute systolic heart failure  2.  Elevated troponin level  3.  Hypertension  4.  Obesity, BMI 41  5.  Proteinuria     Plan:  1.  Acute systolic heart failure  --Echocardiogram shows LVEF 35-40%, moderate global LV hypokinesis  -- Secondary to nonischemic cardiomyopathy, potentially related to poorly controlled hypertension  --Coronary angiogram this morning with no obstructive disease  --Volume overloaded with LVEDP 38 mmHg, continue IV Lasix twice daily today  -- Continue metoprolol  -- Continue losartan, changed to Entresto on discharge once more euvolemic  -- SGLT2 inhibitor on discharge  -- Volume status continues to improve with diuresis today, potential discharge home tomorrow     2.  Elevated troponin  level  -- Likely demand ischemia  -- Coronary angiogram today with angiographically normal coronary arteries      Thank you for allowing me to participate in the care of your patient. Please to not hesitate to contact me with additional questions or concerns.     LYNN Rainey MD  Interventional Cardiology   03/18/25  11:08 EDT

## 2025-03-18 NOTE — PROGRESS NOTES
[facility]    Name:  Kenji Pizarro   Age:  29 y.o.  Sex:  male  :  1995  MRN:  [id number]   Visit Number:  [id number]  Admission Date:  3/17/2025  Date Of Service:  25  Primary Care Physician:  Provider, No Known     LOS: 1 day :    Chief Complaint:      Follow-up; chest pain    Subjective:    Does feel little short of air but comfortable at rest.  Denies any katrin chest pain.  Denies any history of family heart disease at a younger age.    Hospital Course:    Kenji Pizarro is a 29-year-old male with past medical history of hypertension and GERD. Patient presents to the emergency department brought in initially because of a nosebleed with some blood coming from his mouth that awoke him from sleep. Those were stopped before even making it into the ED however he did have some midsternal chest pain with radiation into his back and shoulder. Patient denies significant shortness of breath but does have edema of his lower extremities bilaterally. No history of CHF. On physical exam he does have some crackles in his lungs and he has been tachycardic with a greatly elevated blood pressure during his ER stay. The patient's troponin levels have trended upwards but are only mildly elevated. The case was discussed with cardiology who recommended that the patient be admitted to continue trending troponins but not to start anticoagulation just yet.     Review of Systems:     All systems were reviewed and negative except as mentioned in subjective, assessment and plan.    Vital Signs:    Temp:  [97.5 °F (36.4 °C)-98.4 °F (36.9 °C)] 98.4 °F (36.9 °C)  Heart Rate:  [82-98] 95  Resp:  [18-22] 20  BP: (101-154)/() 122/85    Intake and output:    I/O last 3 completed shifts:  In: 1450 [P.O.:1450]  Out: 3800 [Urine:3800]  I/O this shift:  In: -   Out: 525 [Urine:525]    Physical Examination:    General Appearance:  Alert and cooperative.    Head:  Atraumatic and  "normocephalic.   Eyes: Conjunctivae and sclerae normal, no icterus. No pallor.   Throat: No oral lesions, no thrush, oral mucosa moist.   Neck: Supple, trachea midline, no thyromegaly.   Lungs:   Breath sounds heard bilaterally equally.  No wheezing or crackles. No Pleural rub or bronchial breathing.   Heart:  Normal S1 and S2, no murmur, no gallop, no rub. No JVD.   Abdomen:   Normal bowel sounds, no masses, no organomegaly. Soft, nontender, nondistended, no rebound tenderness.   Extremities: Supple, no edema, no cyanosis, no clubbing.   Skin: Warm.   Neurologic: Alert and oriented x 3. No facial asymmetry. Moves all four limbs. No tremors.      Laboratory results:    Results from last 7 days   Lab Units 03/18/25  0646 03/17/25  0628   SODIUM mmol/L 140 139   POTASSIUM mmol/L 3.9 4.0   CHLORIDE mmol/L 103 105   CO2 mmol/L 26.7 24.4   BUN mg/dL 20 16   CREATININE mg/dL 1.17 1.16   CALCIUM mg/dL 8.5* 8.7   BILIRUBIN mg/dL  --  0.9   ALK PHOS U/L  --  80   ALT (SGPT) U/L  --  43*   AST (SGOT) U/L  --  46*   GLUCOSE mg/dL 104* 98     Results from last 7 days   Lab Units 03/18/25  0646 03/17/25  0628   WBC 10*3/mm3 9.60 9.14   HEMOGLOBIN g/dL 14.3 14.7   HEMATOCRIT % 44.0 44.2   PLATELETS 10*3/mm3 267 298         Results from last 7 days   Lab Units 03/17/25  0729 03/17/25  0628   HSTROP T ng/L 84* 77*         No results for input(s): \"PHART\", \"SLP2SMG\", \"PO2ART\", \"VJV5KXL\", \"BASEEXCESS\" in the last 8760 hours.   I have reviewed the patient's laboratory results.    Radiology results:    Cardiac Catheterization/Vascular Study  Result Date: 3/18/2025  1.  Left dominant system with angiographically normal coronary arteries 2.  Severely elevated LVEDP (38 mmHg)     US Venous Doppler Lower Extremity Bilateral (duplex)  Result Date: 3/17/2025  BILATERAL LOWER EXTREMITY VENOUS DUPLEX DOPPLER EXAMINATION  HISTORY: edema; I50.9-Heart failure, unspecified; R09.02-Hypoxemia; R60.0-Localized edema; R79.89-Other specified abnormal " findings of blood chemistry  COMPARISON:   None  PROCEDURE: Multiple transverse and longitudinal scans were performed of both femoropopliteal deep venous system, with augmentation and compression maneuvers.  FINDINGS: Proper phasic flow was noted in the visualized deep venous system. No intraluminal increased echogenicity is noted to suggest thrombus. There is proper compression and augmentation of the venous structures. No abnormal venous collaterals are seen.      Impression: No evidence of left or right lower extremity deep venous thrombosis.      This report was signed and finalized on 3/17/2025 9:00 AM by Kelsey Rosenthal MD.      CT Angiogram Chest  Result Date: 3/17/2025  PROCEDURE: CT ANGIOGRAM CHEST-  HISTORY: dyspnea/chest pain/hypoxia; I50.9-Heart failure, unspecified; R09.02-Hypoxemia; R60.0-Localized edema; R79.89-Other specified abnormal findings of blood chemistry, shortness of breath  COMPARISON: None.  TECHNIQUE: The patient was injected with  IV contrast. Axial images were obtained through the chest in a CTA/ PE protocol. 3 D Reconstruction images were also performed. This study was performed with techniques to keep radiation doses as low as reasonably achievable, (ALARA). Individualized dose reduction techniques using automated exposure control or adjustment of mA and/or kV according to the patient size were employed.  FINDINGS: There is no axillary adenopathy. There is no hilar or mediastinal adenopathy.  The heart is mildly enlarged, unusual for the patient's age. There also appears to be mild dilatation of the left ventricle. There is a small pericardial effusion and minimal right pleural effusion. Reflux of contrast into the IVC and hepatic veins noted suggesting cardiac dysfunction. No filling defects are identified to suggest PE. There is no evidence of thoracic aortic aneurysm or dissection. Limited images of the upper abdomen are unremarkable. No infiltrate identified. There is a small area of  focal pleural thickening posterior medial right lower lobe best seen series 4 image 147 and series 5 image 267. Interlobular septal thickening identified laterally in the left lower lobe.      Impression: No evidence of aortic aneurysm, dissection or pulmonary embolism.  Cardiomegaly with small pericardial and right pleural effusions and additional findings suggesting some degree of cardiac dysfunction; further evaluation recommended.   CTDI: 6.34 mGy DLP:250.43 mGy.cm  This report was signed and finalized on 3/17/2025 8:27 AM by Kelsey Rosenthal MD.      XR Chest 1 View  Result Date: 3/17/2025  PROCEDURE: XR CHEST 1 VW-  HISTORY: chest pain, epigastric pain and lower chest pain for 1 week.  COMPARISON: None.  FINDINGS: The heart is enlarged with pulmonary vascular congestion.. The lungs are clear. The mediastinum is unremarkable. There is no pneumothorax.  There are no acute osseous abnormalities. Apical lordotic positioning noted.      Impression: Cardiomegaly and pulmonary vascular congestion, consider early CHF..     This report was signed and finalized on 3/17/2025 7:36 AM by Kelsey Rosenthal MD.      I have reviewed the patient's radiology reports.    Medication Review:     I have reviewed the patient's active and prn medications.     Problem List:      CHF (congestive heart failure)      Assessment/Plan:    Inpatient general floor admission 3/17/2025 with chest pain, suspected CHF, accelerated hypertension.    Acute systolic heart failure  Providing IV Lasix.  Continue metoprolol, losartan.  Change losartan to Entresto on discharge once more euvolemic.  SGLT2 inhibitor on discharge.  Cardiology consultation, recommendations appreciated.  Cardiac cath no obstructive disease.  Suspect heart failure secondary to nonischemic cardiomyopathy, potentially related to poorly controlled hypertension.  Echocardiogram LVEF 35-40%.  Demand ischemia  Likely demand ischemia.  Hypertensive urgency  Proteinuria  Nephrology  consultation, recommendations appreciated.    DVT Prophylaxis: Heparin  Code Status: Full code  Diet: Cardiac  Discharge Plan: Home as early as tomorrow depending on clinical status.    Brian Joseph Kerley, DO  03/18/25  16:49 EDT    Dictated utilizing Dragon dictation.    Copied text reviewed and accurate as of today.

## 2025-03-18 NOTE — PROGRESS NOTES
[facility] Progress Note  [facility]. [place]        Patient Name: Kenji Pizarro  : 1995  MRN: [id number]   LOS: 1 day    Patient Care Team:  Provider, No Known as PCP - General    Chief Complaint:    Chief Complaint   Patient presents with    Nose Bleed     Primary Care Physician:  Provider, No Known  Date of admission: 3/17/2025    Subjective     Interval History:   Follow-up hypertensive emergency and proteinuria.  Patient is laying in the bed awake alert and interactive blood pressure appears to be much better.  Events noted from last 24 hours.  I reviewed the chart and other providers notes, labs and procedures done since my last note.    Review of Systems:   As noted above.    Objective     Vitals:   Temp:  [97.5 °F (36.4 °C)-98.4 °F (36.9 °C)] 98.4 °F (36.9 °C)  Heart Rate:  [] 86  Resp:  [18-24] 18  BP: (110-163)/() 137/105  Flow (L/min) (Oxygen Therapy):  [2-3] 3    Intake/Output Summary (Last 24 hours) at 3/18/2025 0813  Last data filed at 3/17/2025 1700  Gross per 24 hour   Intake 1450 ml   Output 3800 ml   Net -2350 ml       Physical Exam:    General Appearance: alert, oriented x 3, no acute distress   Skin: warm and dry  HEENT: oral mucosa normal, nonicteric sclera  Neck: supple, no JVD  Lungs: CTA  Heart: RRR, normal S1 and S2  Abdomen: obese, soft, nontender, non distended and positive bowel sounds.  : no palpable bladder  Extremities: Trace to 1+ edema, no cyanosis or clubbing  Neuro: normal speech and mental status     Scheduled Meds:     Current Facility-Administered Medications   Medication Dose Route Frequency Provider Last Rate Last Admin    [Held by provider] heparin (porcine) 5000 UNIT/ML injection 5,000 Units  5,000 Units Subcutaneous Q8H Elias Cash DO   5,000 Units at 25 1336    nitroglycerin (NITROSTAT) SL tablet 0.4 mg  0.4 mg Sublingual Q5 Min PRN Elias Cash DO           [Held by provider] heparin (porcine),  5,000 Units, Subcutaneous, Q8H        IV Meds:        Results Reviewed:   I have personally reviewed the results from the time of this admission to 3/18/2025 08:13 EDT     Results from last 7 days   Lab Units 03/18/25  0646 03/17/25  0628   SODIUM mmol/L 140 139   POTASSIUM mmol/L 3.9 4.0   CHLORIDE mmol/L 103 105   CO2 mmol/L 26.7 24.4   BUN mg/dL 20 16   CREATININE mg/dL 1.17 1.16   CALCIUM mg/dL 8.5* 8.7   BILIRUBIN mg/dL  --  0.9   ALK PHOS U/L  --  80   ALT (SGPT) U/L  --  43*   AST (SGOT) U/L  --  46*   GLUCOSE mg/dL 104* 98       Estimated Creatinine Clearance: 109.6 mL/min (by C-G formula based on SCr of 1.17 mg/dL).    Results from last 7 days   Lab Units 03/18/25  0646   MAGNESIUM mg/dL 2.2             Results from last 7 days   Lab Units 03/18/25  0646 03/17/25  0628   WBC 10*3/mm3 9.60 9.14   HEMOGLOBIN g/dL 14.3 14.7   PLATELETS 10*3/mm3 267 298             Brief Urine Lab Results  (Last result in the past 365 days)        Color   Clarity   Blood   Leuk Est   Nitrite   Protein   CREAT   Urine HCG        03/17/25 0629             325.6         03/17/25 0629 Dark Yellow   Clear   Negative   Small (1+)   Negative   >=300 mg/dL (3+)                   Protein/Creatinine Ratio, Urine   Date Value Ref Range Status   03/17/2025 985.9 (H) 0.0 - 200.0 mg/G Crea Final       Imaging Results (Last 24 Hours)       Procedure Component Value Units Date/Time    US Venous Doppler Lower Extremity Bilateral (duplex) [[id number]] Collected: 03/17/25 0859     Updated: 03/17/25 0902    Narrative:      BILATERAL LOWER EXTREMITY VENOUS DUPLEX DOPPLER EXAMINATION     HISTORY: edema; I50.9-Heart failure, unspecified; R09.02-Hypoxemia;  R60.0-Localized edema; R79.89-Other specified abnormal findings of blood  chemistry     COMPARISON:   None     PROCEDURE: Multiple transverse and longitudinal scans were performed of  both femoropopliteal deep venous system, with augmentation and  compression maneuvers.     FINDINGS: Proper phasic  flow was noted in the visualized deep venous  system. No intraluminal increased echogenicity is noted to suggest  thrombus. There is proper compression and augmentation of the venous  structures. No abnormal venous collaterals are seen.       Impression:      No evidence of left or right lower extremity deep venous  thrombosis.                 This report was signed and finalized on 3/17/2025 9:00 AM by Kelsey Rosenthal MD.       CT Angiogram Chest [[id number]] Collected: 03/17/25 0826     Updated: 03/17/25 0829    Narrative:      PROCEDURE: CT ANGIOGRAM CHEST-     HISTORY: dyspnea/chest pain/hypoxia; I50.9-Heart failure, unspecified;  R09.02-Hypoxemia; R60.0-Localized edema; R79.89-Other specified abnormal  findings of blood chemistry, shortness of breath     COMPARISON: None.     TECHNIQUE: The patient was injected with  IV contrast. Axial images were  obtained through the chest in a CTA/ PE protocol. 3 D Reconstruction  images were also performed. This study was performed with techniques to  keep radiation doses as low as reasonably achievable, (ALARA).  Individualized dose reduction techniques using automated exposure  control or adjustment of mA and/or kV according to the patient size were  employed.     FINDINGS: There is no axillary adenopathy. There is no hilar or  mediastinal adenopathy.  The heart is mildly enlarged, unusual for the  patient's age. There also appears to be mild dilatation of the left  ventricle. There is a small pericardial effusion and minimal right  pleural effusion. Reflux of contrast into the IVC and hepatic veins  noted suggesting cardiac dysfunction. No filling defects are identified  to suggest PE. There is no evidence of thoracic aortic aneurysm or  dissection. Limited images of the upper abdomen are unremarkable. No  infiltrate identified. There is a small area of focal pleural thickening  posterior medial right lower lobe best seen series 4 image 147 and  series 5 image 267.  Interlobular septal thickening identified laterally  in the left lower lobe.       Impression:      No evidence of aortic aneurysm, dissection or pulmonary  embolism.      Cardiomegaly with small pericardial and right pleural effusions and  additional findings suggesting some degree of cardiac dysfunction;  further evaluation recommended.        CTDI: 6.34 mGy  DLP:250.43 mGy.cm     This report was signed and finalized on 3/17/2025 8:27 AM by Kelsey Rosenthal MD.                   Assessment / Plan     ASSESSMENT:    CHF (congestive heart failure)    Hypertensive urgency: Blood pressure is much better controlled after starting oral medications.    Proteinuria: Check spot protein creatinine ratio if it is nephrotic and we will have to do more aggressive workup to rule out primary as otherwise it may be secondary to uncontrolled hypertension may improve once optimizing the blood pressure control and on blood pressure medications.  Congestive heart failure: Patient received multiple doses of diuretics, echo has been ordered.  Morbid obesity: Complicates all forms of care.      PLAN:  Patient is scheduled to get cardiac catheterization today.  I think you will be a good candidate for low-dose beta-blocker and losartan hydrochlorothiazide 100/25, that will help with the blood pressure as well as cardiorenal protection.  I have started these medications will watch blood pressure all day and reassess again in the morning.  Details were discussed with the patient as well as family in the room.  Patient's wife at the bedside.  Details were also discussed with the hospitalist service and or other providers as needed.   Continue with rest of the current treatment plan, and monitor with surveillance labs.  Further recommendations will depend on clinical course of the patient during the current hospitalization.   I have reviewed the copied text to this note, it was edited and the changes made as needed.  It is accurate to the  point, when the note was signed today.     Thank you for involving us in the care of Kenji Pizarro.  Please feel free to call with any questions.    Vivek Mcgowan MD, JUAN RAOMN  03/18/25  08:13 EDT    [facility]  [phone]      Part of this note may be an electronic transcription/translation of spoken language to printed text using the Dragon Dictation System.

## 2025-03-18 NOTE — PLAN OF CARE
Problem: Adult Inpatient Plan of Care  Goal: Plan of Care Review  Outcome: Progressing  Goal: Patient-Specific Goal (Individualized)  Outcome: Progressing  Goal: Absence of Hospital-Acquired Illness or Injury  Outcome: Progressing  Intervention: Identify and Manage Fall Risk  Recent Flowsheet Documentation  Taken 3/18/2025 1400 by Leesa Urena RN  Safety Promotion/Fall Prevention: safety round/check completed  Taken 3/18/2025 1200 by Leesa Urena RN  Safety Promotion/Fall Prevention: safety round/check completed  Taken 3/18/2025 1000 by Leesa Urena RN  Safety Promotion/Fall Prevention: patient off unit  Taken 3/18/2025 0800 by Leesa Urena RN  Safety Promotion/Fall Prevention: safety round/check completed  Intervention: Prevent Skin Injury  Recent Flowsheet Documentation  Taken 3/18/2025 1400 by Leesa Urena RN  Body Position: position changed independently  Taken 3/18/2025 1200 by Leesa Urena RN  Body Position: position changed independently  Taken 3/18/2025 0800 by Leesa Urena RN  Body Position: position changed independently  Intervention: Prevent Infection  Recent Flowsheet Documentation  Taken 3/18/2025 1400 by Leesa Urena RN  Infection Prevention: single patient room provided  Taken 3/18/2025 1200 by Leesa Urena RN  Infection Prevention: single patient room provided  Taken 3/18/2025 0800 by Leesa Urena RN  Infection Prevention: single patient room provided  Goal: Optimal Comfort and Wellbeing  Outcome: Progressing  Intervention: Provide Person-Centered Care  Recent Flowsheet Documentation  Taken 3/18/2025 0800 by Leesa Urena RN  Trust Relationship/Rapport:   care explained   choices provided  Goal: Readiness for Transition of Care  Outcome: Progressing     Problem: Heart Failure  Goal: Optimal Coping  Outcome: Progressing  Goal: Optimal Cardiac Output and Blood Flow  Outcome: Progressing  Goal: Stable Heart Rate and Rhythm  Outcome: Progressing  Goal: Fluid  and Electrolyte Balance  Outcome: Progressing  Goal: Optimal Functional Ability  Outcome: Progressing  Intervention: Optimize Functional Ability  Recent Flowsheet Documentation  Taken 3/18/2025 1400 by Leesa Urena RN  Activity Management: activity encouraged  Taken 3/18/2025 1200 by Leesa Urena RN  Activity Management: activity encouraged  Taken 3/18/2025 0800 by Leesa Urena RN  Activity Management: activity encouraged  Goal: Improved Oral Intake  Outcome: Progressing  Goal: Effective Oxygenation and Ventilation  Outcome: Progressing  Intervention: Promote Airway Secretion Clearance  Recent Flowsheet Documentation  Taken 3/18/2025 1400 by Leesa Urena RN  Activity Management: activity encouraged  Taken 3/18/2025 1200 by Leesa Urena RN  Activity Management: activity encouraged  Taken 3/18/2025 0800 by Leesa Urena RN  Activity Management: activity encouraged  Goal: Effective Breathing Pattern During Sleep  Outcome: Progressing  Intervention: Monitor and Manage Obstructive Sleep Apnea  Recent Flowsheet Documentation  Taken 3/18/2025 1400 by Leesa Urena RN  Medication Review/Management: medications reviewed  Taken 3/18/2025 1200 by Leesa Urena RN  Medication Review/Management: medications reviewed  Taken 3/18/2025 0800 by Leesa Urena RN  Medication Review/Management: medications reviewed     Problem: Comorbidity Management  Goal: Maintenance of Heart Failure Symptom Control  Outcome: Progressing  Intervention: Maintain Heart Failure Management  Recent Flowsheet Documentation  Taken 3/18/2025 1400 by Leesa Urena RN  Medication Review/Management: medications reviewed  Taken 3/18/2025 1200 by Leesa Urena RN  Medication Review/Management: medications reviewed  Taken 3/18/2025 0800 by Leesa Urena RN  Medication Review/Management: medications reviewed     Problem: Noninvasive Ventilation Acute  Goal: Effective Unassisted Ventilation and Oxygenation  Outcome:  Progressing   Goal Outcome Evaluation:

## 2025-03-19 ENCOUNTER — READMISSION MANAGEMENT (OUTPATIENT)
Dept: CALL CENTER | Facility: HOSPITAL | Age: 30
End: 2025-03-19

## 2025-03-19 VITALS
HEART RATE: 98 BPM | RESPIRATION RATE: 20 BRPM | WEIGHT: 244.49 LBS | TEMPERATURE: 97.6 F | SYSTOLIC BLOOD PRESSURE: 122 MMHG | BODY MASS INDEX: 39.29 KG/M2 | OXYGEN SATURATION: 95 % | DIASTOLIC BLOOD PRESSURE: 93 MMHG | HEIGHT: 66 IN

## 2025-03-19 PROBLEM — I50.23 ACUTE ON CHRONIC HFREF (HEART FAILURE WITH REDUCED EJECTION FRACTION): Status: ACTIVE | Noted: 2025-03-19

## 2025-03-19 LAB
ALBUMIN SERPL-MCNC: 3.8 G/DL (ref 3.5–5.2)
ANION GAP SERPL CALCULATED.3IONS-SCNC: 13.3 MMOL/L (ref 5–15)
BUN SERPL-MCNC: 20 MG/DL (ref 6–20)
BUN/CREAT SERPL: 17.5 (ref 7–25)
CALCIUM SPEC-SCNC: 9.3 MG/DL (ref 8.6–10.5)
CHLORIDE SERPL-SCNC: 98 MMOL/L (ref 98–107)
CO2 SERPL-SCNC: 26.7 MMOL/L (ref 22–29)
CREAT SERPL-MCNC: 1.14 MG/DL (ref 0.76–1.27)
DEPRECATED RDW RBC AUTO: 41.7 FL (ref 37–54)
EGFRCR SERPLBLD CKD-EPI 2021: 88.2 ML/MIN/1.73
ERYTHROCYTE [DISTWIDTH] IN BLOOD BY AUTOMATED COUNT: 13.4 % (ref 12.3–15.4)
GLUCOSE SERPL-MCNC: 110 MG/DL (ref 65–99)
HCT VFR BLD AUTO: 50.8 % (ref 37.5–51)
HGB BLD-MCNC: 16.3 G/DL (ref 13–17.7)
MCH RBC QN AUTO: 27.3 PG (ref 26.6–33)
MCHC RBC AUTO-ENTMCNC: 32.1 G/DL (ref 31.5–35.7)
MCV RBC AUTO: 85.1 FL (ref 79–97)
PHOSPHATE SERPL-MCNC: 4 MG/DL (ref 2.5–4.5)
PLATELET # BLD AUTO: 288 10*3/MM3 (ref 140–450)
PMV BLD AUTO: 11 FL (ref 6–12)
POTASSIUM SERPL-SCNC: 3.8 MMOL/L (ref 3.5–5.2)
RBC # BLD AUTO: 5.97 10*6/MM3 (ref 4.14–5.8)
SODIUM SERPL-SCNC: 138 MMOL/L (ref 136–145)
WBC NRBC COR # BLD AUTO: 8.79 10*3/MM3 (ref 3.4–10.8)

## 2025-03-19 PROCEDURE — 94618 PULMONARY STRESS TESTING: CPT

## 2025-03-19 PROCEDURE — 85027 COMPLETE CBC AUTOMATED: CPT | Performed by: INTERNAL MEDICINE

## 2025-03-19 PROCEDURE — 99239 HOSP IP/OBS DSCHRG MGMT >30: CPT | Performed by: STUDENT IN AN ORGANIZED HEALTH CARE EDUCATION/TRAINING PROGRAM

## 2025-03-19 PROCEDURE — 25010000002 FUROSEMIDE PER 20 MG: Performed by: STUDENT IN AN ORGANIZED HEALTH CARE EDUCATION/TRAINING PROGRAM

## 2025-03-19 PROCEDURE — 94799 UNLISTED PULMONARY SVC/PX: CPT

## 2025-03-19 PROCEDURE — 80069 RENAL FUNCTION PANEL: CPT | Performed by: INTERNAL MEDICINE

## 2025-03-19 PROCEDURE — 94660 CPAP INITIATION&MGMT: CPT

## 2025-03-19 PROCEDURE — 25010000002 HEPARIN (PORCINE) PER 1000 UNITS: Performed by: STUDENT IN AN ORGANIZED HEALTH CARE EDUCATION/TRAINING PROGRAM

## 2025-03-19 RX ORDER — METOPROLOL SUCCINATE 50 MG/1
50 TABLET, EXTENDED RELEASE ORAL
Qty: 30 TABLET | Refills: 0 | Status: SHIPPED | OUTPATIENT
Start: 2025-03-20

## 2025-03-19 RX ORDER — DAPAGLIFLOZIN 5 MG/1
5 TABLET, FILM COATED ORAL DAILY
Qty: 30 TABLET | Refills: 0 | Status: SHIPPED | OUTPATIENT
Start: 2025-03-19

## 2025-03-19 RX ORDER — FUROSEMIDE 40 MG/1
40 TABLET ORAL DAILY
Qty: 14 TABLET | Refills: 0 | Status: SHIPPED | OUTPATIENT
Start: 2025-03-19

## 2025-03-19 RX ORDER — FUROSEMIDE 40 MG/1
40 TABLET ORAL DAILY
Qty: 4 TABLET | Refills: 0 | Status: SHIPPED | OUTPATIENT
Start: 2025-03-19 | End: 2025-03-19

## 2025-03-19 RX ADMIN — FUROSEMIDE 40 MG: 10 INJECTION, SOLUTION INTRAMUSCULAR; INTRAVENOUS at 08:04

## 2025-03-19 RX ADMIN — HYDROCHLOROTHIAZIDE 25 MG: 25 TABLET ORAL at 08:04

## 2025-03-19 RX ADMIN — HEPARIN SODIUM 5000 UNITS: 5000 INJECTION INTRAVENOUS; SUBCUTANEOUS at 05:23

## 2025-03-19 RX ADMIN — LOSARTAN POTASSIUM 100 MG: 25 TABLET, FILM COATED ORAL at 08:04

## 2025-03-19 RX ADMIN — METOPROLOL SUCCINATE 50 MG: 25 TABLET, EXTENDED RELEASE ORAL at 08:04

## 2025-03-19 NOTE — PROGRESS NOTES
[facility] Progress Note  [facility]. [place]        Patient Name: Kenji Pizarro  : 1995  MRN: [id number]   LOS: 2 days    Patient Care Team:  Provider, No Known as PCP - General    Chief Complaint:    Chief Complaint   Patient presents with    Nose Bleed     Primary Care Physician:  Provider, No Known  Date of admission: 3/17/2025    Subjective     Interval History:   Follow-up hypertensive emergency and proteinuria.  Patient is laying in the bed awake alert and interactive blood pressure appears to be much better.  Events noted from last 24 hours.  I reviewed the chart and other providers notes, labs and procedures done since my last note.    Review of Systems:   As noted above.    Objective     Vitals:   Temp:  [97.6 °F (36.4 °C)-98.2 °F (36.8 °C)] 97.6 °F (36.4 °C)  Heart Rate:  [95-98] 98  Resp:  [14-20] 20  BP: (108-122)/(93-95) 122/93  Flow (L/min) (Oxygen Therapy):  [4] 4    Intake/Output Summary (Last 24 hours) at 3/19/2025 1837  Last data filed at 3/19/2025 0800  Gross per 24 hour   Intake 730 ml   Output --   Net 730 ml       Physical Exam:    General Appearance: alert, oriented x 3, no acute distress   Skin: warm and dry  HEENT: oral mucosa normal, nonicteric sclera  Neck: supple, no JVD  Lungs: CTA  Heart: RRR, normal S1 and S2  Abdomen: obese, soft, nontender, non distended and positive bowel sounds.  : no palpable bladder  Extremities: Trace to 1+ edema, no cyanosis or clubbing  Neuro: normal speech and mental status     Scheduled Meds:     No current facility-administered medications for this encounter.     Current Outpatient Medications   Medication Sig Dispense Refill    acetaminophen (TYLENOL) 500 MG tablet Take 1 tablet by mouth Every 6 (Six) Hours As Needed.      furosemide (Lasix) 40 MG tablet Take 1 tablet by mouth Daily. 14 tablet 0    [START ON 3/20/2025] metoprolol succinate XL (TOPROL-XL) 50 MG 24 hr tablet Take 1 tablet by mouth Daily. 30  tablet 0    omeprazole (priLOSEC) 20 MG capsule Take 1 capsule by mouth Daily.      dapagliflozin (FARXIGA) 5 MG tablet tablet Take 1 tablet by mouth Daily. 30 tablet 0    sacubitril-valsartan (Entresto) 24-26 MG tablet Take 1 tablet by mouth 2 (Two) Times a Day. 60 tablet 0             IV Meds:   No current facility-administered medications for this encounter.      Results Reviewed:   I have personally reviewed the results from the time of this admission to 3/19/2025 18:37 EDT     Results from last 7 days   Lab Units 03/19/25  1010 03/18/25  0646 03/17/25  0628   SODIUM mmol/L 138 140 139   POTASSIUM mmol/L 3.8 3.9 4.0   CHLORIDE mmol/L 98 103 105   CO2 mmol/L 26.7 26.7 24.4   BUN mg/dL 20 20 16   CREATININE mg/dL 1.14 1.17 1.16   CALCIUM mg/dL 9.3 8.5* 8.7   BILIRUBIN mg/dL  --   --  0.9   ALK PHOS U/L  --   --  80   ALT (SGPT) U/L  --   --  43*   AST (SGOT) U/L  --   --  46*   GLUCOSE mg/dL 110* 104* 98       Estimated Creatinine Clearance: 109.8 mL/min (by C-G formula based on SCr of 1.14 mg/dL).    Results from last 7 days   Lab Units 03/19/25  1010 03/18/25  0646   MAGNESIUM mg/dL  --  2.2   PHOSPHORUS mg/dL 4.0  --              Results from last 7 days   Lab Units 03/19/25  1010 03/18/25  0646 03/17/25  0628   WBC 10*3/mm3 8.79 9.60 9.14   HEMOGLOBIN g/dL 16.3 14.3 14.7   PLATELETS 10*3/mm3 288 267 298             Brief Urine Lab Results  (Last result in the past 365 days)        Color   Clarity   Blood   Leuk Est   Nitrite   Protein   CREAT   Urine HCG        03/17/25 0629             325.6         03/17/25 0629 Dark Yellow   Clear   Negative   Small (1+)   Negative   >=300 mg/dL (3+)                   Protein/Creatinine Ratio, Urine   Date Value Ref Range Status   03/17/2025 985.9 (H) 0.0 - 200.0 mg/G Crea Final       Imaging Results (Last 24 Hours)       ** No results found for the last 24 hours. **                Assessment / Plan     ASSESSMENT:    CHF (congestive heart failure)    Acute on chronic HFrEF  (heart failure with reduced ejection fraction)    Hypertensive urgency: Blood pressure is much better controlled after starting oral medications.    Proteinuria: Check spot protein creatinine ratio if it is nephrotic and we will have to do more aggressive workup to rule out primary as otherwise it may be secondary to uncontrolled hypertension may improve once optimizing the blood pressure control and on blood pressure medications.  Congestive heart failure: Patient received multiple doses of diuretics, echo has been ordered.  Morbid obesity: Complicates all forms of care.      PLAN:  Blood pressure appears to be much better.  No labs were ordered I have ordered the labs and will review as they become available.  I think it be good to go with Hyzaar 100/25 mg 1 a day and add metoprolol XL 50 mg once a day.  He may end up requiring more diuretics.  Details were discussed with the patient as well as family in the room.  Patient's wife at the bedside.  Details were also discussed with the hospitalist service and or other providers as needed.   Continue with rest of the current treatment plan, and monitor with surveillance labs.  Further recommendations will depend on clinical course of the patient during the current hospitalization.   I have reviewed the copied text to this note, it was edited and the changes made as needed.  It is accurate to the point, when the note was signed today.     Thank you for involving us in the care of Kenji Pizarro.  Please feel free to call with any questions.    Vivek Mcgowan MD, JUAN RAMON  03/19/25  18:37 EDT    [facility]  [phone]      Part of this note may be an electronic transcription/translation of spoken language to printed text using the Dragon Dictation System.

## 2025-03-19 NOTE — CASE MANAGEMENT/SOCIAL WORK
Case Management Discharge Note      Final Note: Plans to DC home with family    Provided Post Acute Provider List?: N/A  Provided Post Acute Provider Quality & Resource List?: N/A    Selected Continued Care - Discharged on 3/19/2025 Admission date: 3/17/2025 - Discharge disposition: Home or Self Care      Destination    No services have been selected for the patient.                 Transportation Services  Private: Car    Final Discharge Disposition Code: 01 - home or self-care

## 2025-03-19 NOTE — CASE MANAGEMENT/SOCIAL WORK
Continued Stay Note   [facility]     Patient Name: Kenji Pizarro  MRN: [id number]  Today's Date: 3/19/2025    Admit Date: 3/17/2025    Plan: spoke with pt and family; family plans to transport home; no other cm needs   Discharge Plan       Row Name 03/19/25 1055       Plan    Plan spoke with pt and family; family plans to transport home; no other cm needs                                      Discharge Codes    No documentation.                 Expected Discharge Date and Time       Expected Discharge Date Expected Discharge Time    Mar 19, 2025               Reta Marks RN

## 2025-03-19 NOTE — DISCHARGE SUMMARY
[facility]   DISCHARGE SUMMARY      Name:  Kenji Pizarro   Age:  29 y.o.  Sex:  male  :  1995  MRN:  [id number]   Visit Number:  [id number]    Admission Date:  3/17/2025  Date of Discharge:  3/19/2025  Primary Care Physician:  Provider, No Known      Problem List:     Active Hospital Problems    Diagnosis  POA    **CHF (congestive heart failure) [I50.9]  Yes    Acute on chronic HFrEF (heart failure with reduced ejection fraction) [I50.23]  Yes      Resolved Hospital Problems   No resolved problems to display.     Presenting Problem:    Chief Complaint   Patient presents with    Nose Bleed      Consults:     Consulting Physician(s)         Provider   Role Specialty     Vivek Mcgowan MD, JUAN RAMON      Consulting Physician Nephrology     Jose Rainey MD      Consulting Physician Cardiology          Procedures Performed:    Procedure(s):  Coronary angiography    History of presenting illness/Hospital Course:    Kenji Pizarro is a 29-year-old male with past medical history of hypertension and GERD. Patient presents to the emergency department brought in initially because of a nosebleed with some blood coming from his mouth that awoke him from sleep. Those were stopped before even making it into the ED however he did have some midsternal chest pain with radiation into his back and shoulder. Patient denies significant shortness of breath but does have edema of his lower extremities bilaterally. No history of CHF. On physical exam he does have some crackles in his lungs and he has been tachycardic with a greatly elevated blood pressure during his ER stay. The patient's troponin levels have trended upwards but are only mildly elevated. The case was discussed with cardiology who recommended that the patient be admitted to continue trending troponins but not to start anticoagulation just yet.     Inpatient general floor admission 3/17/2025 with chest pain, suspected CHF, accelerated  hypertension.  Cardiac cath nonobstructive.  Echocardiogram low EF.  Cardiology assessed likely hypertensive cardiomyopathy.  Over 2 L removed net volume with Lasix.  Work of breathing improved.    Stable for discharge home 3/19/2025.  Follow-up with [facility].     Hypoxia, resolved  Resolved with diuresis. Likely from CHF.   Acute systolic heart failure  Continue metoprolol, Entresto, SGLT2.  Follow-up [facility] clinic.  Providing short-term Lasix for the next few days.  Discussed caution regarding weighing himself and fluid restriction.  Cardiology consultation, recommendations appreciated.  Cardiac cath no obstructive disease.  Suspect heart failure secondary to nonischemic cardiomyopathy, potentially related to poorly controlled hypertension.  Echocardiogram LVEF 35-40%.  Demand ischemia  Likely demand ischemia.  Hypertensive urgency, resolved  Proteinuria  Follow-up with PCP.    Vital Signs:    Temp:  [97.6 °F (36.4 °C)-98.2 °F (36.8 °C)] 97.6 °F (36.4 °C)  Heart Rate:  [75-98] 95  Resp:  [14-20] 20  BP: (101-154)/() 122/93    Physical Exam:    General Appearance:  Alert and cooperative. Obese   Head:  Atraumatic and normocephalic.   Eyes: Conjunctivae and sclerae normal, no icterus. No pallor.   Ears:  Ears with no abnormalities noted.   Throat: No oral lesions, no thrush, oral mucosa moist.   Neck: Supple, trachea midline, no thyromegaly.   Back:   No kyphoscoliosis present. No tenderness to palpation.   Lungs:   Breath sounds heard bilaterally equally.  No crackles or wheezing. No Pleural rub or bronchial breathing.   Heart:  Normal S1 and S2, no murmur, no gallop, no rub. No JVD.   Abdomen:   Normal bowel sounds, no masses, no organomegaly. Soft, nontender, nondistended, no rebound tenderness.   Extremities: Supple, no edema, no cyanosis, no clubbing.   Pulses: Pulses palpable bilaterally.   Skin: Warm.   Neurologic: Alert and oriented x 3. No facial asymmetry. Moves all four limbs. No tremors.      Pertinent Lab Results:     Results from last 7 days   Lab Units 03/18/25  0646 03/17/25  0628   SODIUM mmol/L 140 139   POTASSIUM mmol/L 3.9 4.0   CHLORIDE mmol/L 103 105   CO2 mmol/L 26.7 24.4   BUN mg/dL 20 16   CREATININE mg/dL 1.17 1.16   CALCIUM mg/dL 8.5* 8.7   BILIRUBIN mg/dL  --  0.9   ALK PHOS U/L  --  80   ALT (SGPT) U/L  --  43*   AST (SGOT) U/L  --  46*   GLUCOSE mg/dL 104* 98     Results from last 7 days   Lab Units 03/18/25  0646 03/17/25  0628   WBC 10*3/mm3 9.60 9.14   HEMOGLOBIN g/dL 14.3 14.7   HEMATOCRIT % 44.0 44.2   PLATELETS 10*3/mm3 267 298         Results from last 7 days   Lab Units 03/17/25  0729 03/17/25  0628   HSTROP T ng/L 84* 77*     Results from last 7 days   Lab Units 03/17/25  0628   PROBNP pg/mL 2,235.0*                       Pertinent Radiology Results:    Imaging Results (All)       Procedure Component Value Units Date/Time    US Venous Doppler Lower Extremity Bilateral (duplex) [[id number]] Collected: 03/17/25 0859     Updated: 03/17/25 0902    Narrative:      BILATERAL LOWER EXTREMITY VENOUS DUPLEX DOPPLER EXAMINATION     HISTORY: edema; I50.9-Heart failure, unspecified; R09.02-Hypoxemia;  R60.0-Localized edema; R79.89-Other specified abnormal findings of blood  chemistry     COMPARISON:   None     PROCEDURE: Multiple transverse and longitudinal scans were performed of  both femoropopliteal deep venous system, with augmentation and  compression maneuvers.     FINDINGS: Proper phasic flow was noted in the visualized deep venous  system. No intraluminal increased echogenicity is noted to suggest  thrombus. There is proper compression and augmentation of the venous  structures. No abnormal venous collaterals are seen.       Impression:      No evidence of left or right lower extremity deep venous  thrombosis.                 This report was signed and finalized on 3/17/2025 9:00 AM by Kelsey Rosenthal MD.       CT Angiogram Chest [[id number]] Collected: 03/17/25 0826     Updated:  03/17/25 0829    Narrative:      PROCEDURE: CT ANGIOGRAM CHEST-     HISTORY: dyspnea/chest pain/hypoxia; I50.9-Heart failure, unspecified;  R09.02-Hypoxemia; R60.0-Localized edema; R79.89-Other specified abnormal  findings of blood chemistry, shortness of breath     COMPARISON: None.     TECHNIQUE: The patient was injected with  IV contrast. Axial images were  obtained through the chest in a CTA/ PE protocol. 3 D Reconstruction  images were also performed. This study was performed with techniques to  keep radiation doses as low as reasonably achievable, (ALARA).  Individualized dose reduction techniques using automated exposure  control or adjustment of mA and/or kV according to the patient size were  employed.     FINDINGS: There is no axillary adenopathy. There is no hilar or  mediastinal adenopathy.  The heart is mildly enlarged, unusual for the  patient's age. There also appears to be mild dilatation of the left  ventricle. There is a small pericardial effusion and minimal right  pleural effusion. Reflux of contrast into the IVC and hepatic veins  noted suggesting cardiac dysfunction. No filling defects are identified  to suggest PE. There is no evidence of thoracic aortic aneurysm or  dissection. Limited images of the upper abdomen are unremarkable. No  infiltrate identified. There is a small area of focal pleural thickening  posterior medial right lower lobe best seen series 4 image 147 and  series 5 image 267. Interlobular septal thickening identified laterally  in the left lower lobe.       Impression:      No evidence of aortic aneurysm, dissection or pulmonary  embolism.      Cardiomegaly with small pericardial and right pleural effusions and  additional findings suggesting some degree of cardiac dysfunction;  further evaluation recommended.        CTDI: 6.34 mGy  DLP:250.43 mGy.cm     This report was signed and finalized on 3/17/2025 8:27 AM by Kelsey Rosenthal MD.       XR Chest 1 View [[id number]]  Collected: 03/17/25 0735     Updated: 03/17/25 0738    Narrative:      PROCEDURE: XR CHEST 1 VW-     HISTORY: chest pain, epigastric pain and lower chest pain for 1 week.     COMPARISON: None.     FINDINGS: The heart is enlarged with pulmonary vascular congestion.. The  lungs are clear. The mediastinum is unremarkable. There is no  pneumothorax.  There are no acute osseous abnormalities. Apical lordotic  positioning noted.        Impression:      Cardiomegaly and pulmonary vascular congestion, consider  early CHF..              This report was signed and finalized on 3/17/2025 7:36 AM by Kelsey Rosenthal MD.               Echo:    Results for orders placed during the hospital encounter of 03/17/25    Adult Transthoracic Echo Complete With Contrast if Necessary Per Protocol    Interpretation Summary  1.  Mild left ventricular dilation with moderate reduced LV systolic function, LVEF 35-40%.  2.  Mild concentric LVH.  3.  Normal right ventricular size and systolic function.  4.  Grade 1 diastolic dysfunction.  5.  Normal left atrial volume index.  6.  No significant valvular abnormalities.  7.  Small circumferential pericardial effusion without tamponade physiology.    Condition on Discharge:      Stable.    Code status during the hospital stay:    Code Status and Medical Interventions: CPR (Attempt to Resuscitate); Full Support   Ordered at: 03/17/25 1241     Code Status (Patient has no pulse and is not breathing):    CPR (Attempt to Resuscitate)     Medical Interventions (Patient has pulse or is breathing):    Full Support     Discharge Disposition:    Home or Self Care    Discharge Medications:       Discharge Medications        New Medications        Instructions Start Date   dapagliflozin 5 MG tablet tablet  Commonly known as: FARXIGA   5 mg, Oral, Daily      furosemide 40 MG tablet  Commonly known as: Lasix   40 mg, Oral, Daily      metoprolol succinate XL 50 MG 24 hr tablet  Commonly known as: TOPROL-XL   50 mg,  Oral, Every 24 Hours Scheduled   Start Date: March 20, 2025     sacubitril-valsartan 24-26 MG tablet  Commonly known as: Entresto   1 tablet, Oral, 2 Times Daily             Continue These Medications        Instructions Start Date   acetaminophen 500 MG tablet  Commonly known as: TYLENOL   500 mg, Oral, Every 6 Hours PRN      omeprazole 20 MG capsule  Commonly known as: priLOSEC   20 mg, Daily             Stop These Medications      hydroCHLOROthiazide 12.5 MG tablet     lisinopril 10 MG tablet  Commonly known as: PRINIVIL,ZESTRIL            Discharge Diet:     Diet Instructions       Diet: Cardiac Diets, Fluid Restriction (240 mL/tray) Diets; Low Sodium (2g); Pureed (NDD 1); Thin (IDDSI 0); 1500 mL/day      Discharge Diet:  Cardiac Diets  Fluid Restriction (240 mL/tray) Diets       Cardiac Diet: Low Sodium (2g)    Texture: Pureed (NDD 1)    Fluid Consistency: Thin (IDDSI 0)    Fluid Restriction Diet (240 mL/tray): 1500 mL/day          Activity at Discharge:     Activity Instructions       Activity as Tolerated            Follow-up Appointments:    Additional Instructions for the Follow-ups that You Need to Schedule       Ambulatory Referral to Disease State Management   As directed      To dept: [facility] [[id number]]   What program(s) are you referring for?: Heart Failure   Follow-up needed: Yes               Follow-up Information       Provider, No Known Follow up.    Why: [facility]. patient has no insurance  Contact information:  [facility] [address] [phone]                           No future appointments.  Test Results Pending at Discharge:    Pending Results       Procedure [Order ID] Specimen - Date/Time    CBC (No Diff) [[id number]]     Specimen: Blood     Renal Function Panel [[id number]]     Specimen: Blood                  Brian Joseph Kerley, DO  03/19/25  09:06 EDT    Time: I spent 35 minutes on this discharge activity which included: face-to-face encounter  with the patient, reviewing the data in the system, coordination of the care with the nursing staff as well as consultants, documentation, and entering orders.     Dictated utilizing Dragon dictation.    Copied text reviewed and accurate as of today.

## 2025-03-19 NOTE — OUTREACH NOTE
Prep Survey      Flowsheet Row Responses   [facility] patient discharged from? [facility]   Is LACE score < 7 ? No   Eligibility [facility]   Date of Admission 03/17/25   Date of Discharge 03/19/25   Discharge Disposition Home or Self Care   Discharge diagnosis A/C CHF, heart cath - no CAD   Does the patient have one of the following disease processes/diagnoses(primary or secondary)? CHF   Does the patient have Home health ordered? No   Is there a DME ordered? No   Comments regarding appointments newP appt   Prep survey completed? Yes            Jocelyne MILLER - Registered Nurse

## 2025-03-19 NOTE — PROGRESS NOTES
Exercise Oximetry    Patient Name:Kenji Pizarro   MRN: [id number]   Date: 03/19/25             ROOM AIR BASELINE   SpO2% 94   Heart Rate 93   Blood Pressure 122/93     EXERCISE ON ROOM AIR SpO2% EXERCISE ON O2 @ LPM SpO2%   1 MINUTE 95 1 MINUTE    2 MINUTES 94 2 MINUTES    3 MINUTES 95 3 MINUTES    4 MINUTES 93 4 MINUTES    5 MINUTES 94 5 MINUTES    6 MINUTES 94 6 MINUTES               Distance Walked  250ft Distance Walked   Dyspnea (Fanta Scale)  2 Dyspnea (Fanta Scale)   Fatigue (Fanta Scale) 2 Fatigue (Fanta Scale)   SpO2% Post Exercise 94 SpO2% Post Exercise   HR Post Exercise   HR Post Exercise   Time to Recovery0 Time to Recovery     Comments: Patient's resting room air SpO2 was 93% and was >93% with ambulation.

## 2025-03-19 NOTE — PLAN OF CARE
Goal Outcome Evaluation:   VSS during shift. Pt's O2 sat began dropping while pt was resting comfortably with eyes closed. BiPAP applied with interventions effective per pt. No s/s of acute distress noted at this time. Significant other to remain at bedside. Pt with plans to discharge home today per MD Rainey. POC ongoing.

## 2025-03-19 NOTE — PROGRESS NOTES
[facility] Cardiology IP Progress Note    Kenji Pizarro  1995  [id number]  03/19/25    Subjective:   Mr. Kenji Pizarro is a 29 y.o. male follow-up for acute systolic heart failure.  No acute overnight events.  Underwent coronary angiogram yesterday without difficulty.  No significant pain at the right radial access site.  This morning, force of shortness of breath is significantly improved.  Blood pressure has been well-controlled.    Review of Systems:   Review of Systems   Constitutional:  Negative for activity change, appetite change, chills, diaphoresis, fatigue, fever, unexpected weight gain and unexpected weight loss.   Respiratory:  Negative for cough, chest tightness, shortness of breath and wheezing.    Cardiovascular:  Negative for chest pain, palpitations and leg swelling.   Gastrointestinal:  Negative for abdominal pain, anal bleeding, blood in stool and GERD.   Neurological:  Negative for dizziness, syncope, weakness and light-headedness.   Hematological:  Does not bruise/bleed easily.   Psychiatric/Behavioral:  Negative for depressed mood and stress. The patient is not nervous/anxious.        I have reviewed and/or updated the patient's past medical, past surgical, family history, social history, problem list and allergies as appropriate in the chart.     Physical Exam:  Vital Signs:   Vitals:    03/18/25 2300 03/19/25 0523 03/19/25 0534 03/19/25 0700   BP:   108/95 122/93   BP Location:   Left arm Left arm   Patient Position:   Lying Sitting   Pulse:    95   Resp: 20  14 20   Temp: 97.7 °F (36.5 °C)  98.2 °F (36.8 °C) 97.6 °F (36.4 °C)   TempSrc: Oral  Axillary Oral   SpO2:       Weight:  111 kg (244 lb 7.8 oz)     Height:           Physical Exam  Vitals and nursing note reviewed.   Constitutional:       General: He is not in acute distress.     Appearance: He is obese. He is not diaphoretic.   HENT:      Head: Normocephalic and atraumatic.   Cardiovascular:      Rate and  Rhythm: Normal rate and regular rhythm.      Heart sounds: No murmur heard.  Pulmonary:      Effort: Pulmonary effort is normal. No respiratory distress.      Breath sounds: Normal breath sounds. No stridor. No wheezing, rhonchi or rales.   Abdominal:      General: Bowel sounds are normal. There is no distension.      Palpations: Abdomen is soft.      Tenderness: There is no abdominal tenderness. There is no guarding or rebound.   Musculoskeletal:         General: Swelling present. Normal range of motion.      Cervical back: Neck supple. No tenderness.   Skin:     General: Skin is warm and dry.   Neurological:      General: No focal deficit present.      Mental Status: He is alert and oriented to person, place, and time.   Psychiatric:         Mood and Affect: Mood normal.         Behavior: Behavior normal.         Results Review:   Results from last 7 days   Lab Units 03/18/25  0646 03/17/25  0628   SODIUM mmol/L 140 139   POTASSIUM mmol/L 3.9 4.0   CHLORIDE mmol/L 103 105   CO2 mmol/L 26.7 24.4   BUN mg/dL 20 16   CREATININE mg/dL 1.17 1.16   CALCIUM mg/dL 8.5* 8.7   BILIRUBIN mg/dL  --  0.9   ALK PHOS U/L  --  80   ALT (SGPT) U/L  --  43*   AST (SGOT) U/L  --  46*   GLUCOSE mg/dL 104* 98     Results from last 7 days   Lab Units 03/17/25  0729 03/17/25  0628   HSTROP T ng/L 84* 77*     Results from last 7 days   Lab Units 03/18/25  0646 03/17/25  0628   WBC 10*3/mm3 9.60 9.14   HEMOGLOBIN g/dL 14.3 14.7   HEMATOCRIT % 44.0 44.2   PLATELETS 10*3/mm3 267 298         Results from last 7 days   Lab Units 03/18/25  0646   MAGNESIUM mg/dL 2.2           I personally viewed and interpreted the patient's EKG/Telemetry data     Medications:   )  Current Facility-Administered Medications:     heparin (porcine) 5000 UNIT/ML injection 5,000 Units, 5,000 Units, Subcutaneous, Q8H, Kerley, Brian Joseph, DO, 5,000 Units at 03/19/25 0523    losartan (COZAAR) tablet 100 mg, 100 mg, Oral, Q24H, 100 mg at 03/19/25 0804 **AND**  hydroCHLOROthiazide tablet 25 mg, 25 mg, Oral, Q24H, Jose Rainey MD, 25 mg at 03/19/25 0804    metoprolol succinate XL (TOPROL-XL) 24 hr tablet 50 mg, 50 mg, Oral, Q24H, Jose Rainey MD, 50 mg at 03/19/25 0804    nitroglycerin (NITROSTAT) SL tablet 0.4 mg, 0.4 mg, Sublingual, Q5 Min PRN, Jose Rainey MD    Assessment:  1.  Acute systolic heart failure  2.  Elevated troponin level  3.  Hypertension  4.  Obesity, BMI 41  5.  Proteinuria     Plan:  1.  Acute systolic heart failure  --Echocardiogram shows LVEF 35-40%, moderate global LV hypokinesis  -- Secondary to nonischemic cardiomyopathy, potentially related to poorly controlled hypertension  -- Volume status improving, however given significantly elevated LVEDP recommend Lasix 40 mg oral daily on discharge  -- Continue metoprolol  -- Change losartan to Entresto on discharge  -- SGLT2 inhibitor on discharge  -- Follow-up in the cardiology clinic in 1 week to reevaluate volume status and reconsider need for daily Lasix     2.  Elevated troponin level  -- Likely demand ischemia  -- Coronary angiogram today with angiographically normal coronary arteries      Thank you for allowing me to participate in the care of your patient. Please to not hesitate to contact me with additional questions or concerns.     LYNN Rainey MD  Interventional Cardiology   03/19/25  09:55 EDT

## 2025-03-20 ENCOUNTER — TRANSITIONAL CARE MANAGEMENT TELEPHONE ENCOUNTER (OUTPATIENT)
Dept: CALL CENTER | Facility: HOSPITAL | Age: 30
End: 2025-03-20

## 2025-03-20 NOTE — OUTREACH NOTE
Call Center TCM Note      Flowsheet Row Responses   [facility] patient discharged from? [facility]   Does the patient have one of the following disease processes/diagnoses(primary or secondary)? CHF   TCM attempt successful? Yes   Call start time 0925   Call end time 0928   Discharge diagnosis A/C CHF, heart cath - no CAD   Person spoke with today (if not patient) and relationship pt   Meds reviewed with patient/caregiver? Yes   Is the patient having any side effects they believe may be caused by any medication additions or changes? No   Does the patient have all medications ordered at discharge? Yes   Is the patient taking all medications as directed (includes completed medication regime)? Yes   Comments [facility] 3/24/25,  Cardiology 3/26/25   Does the patient have an appointment with their PCP within 7-14 days of discharge? Yes  [3/25/2025 at  9:15 AM]   Psychosocial issues? No   Did the patient receive a copy of their discharge instructions? Yes   Nursing interventions Reviewed instructions with patient   What is the patient's perception of their health status since discharge? Improving   Nursing interventions Nurse provided patient education   Is the patient able to teach back signs and symptoms of worsening condition? (i.e. weight gain, shortness of air, etc.) Yes   Is the patient/caregiver able to teach back the hierarchy of who to call/visit for symptoms/problems? PCP, Specialist, Home health nurse, Urgent Care, ED, 911 Yes   Is the patient able to teach back Heart Failure Zones? Yes   CHF Zone this Call Green Zone   Green Zone Patient reports doing well, No new or worsening shortness of breath, Physical activity level is normal for you, No new swelling -  feet, ankles and legs look normal for you, Weight check stable, No chest pain   Green Zone Interventions Daily weight check, Meds as directed, Low sodium diet, Follow up visits planned   TCM call completed? Yes   Wrap up additional comments Pt denies  any chest pain, and SOB is better. Reviewed AVS/meds, and the importance of weighing daily, and when to call cardiologist office r/t wt gain/increased SOB. Pt verified [facility], PCP, Cardiologist fu appts.   Call end time 0928   Would this patient benefit from a Referral to [facility] Social Work? No   Is the patient interested in additional calls from an ambulatory ? No             Aleida Aguilar RN    3/20/2025, 09:30 EDT

## 2025-03-24 ENCOUNTER — DISEASE STATE MANAGEMENT VISIT (OUTPATIENT)
Dept: PHARMACY | Facility: HOSPITAL | Age: 30
End: 2025-03-24

## 2025-03-24 VITALS
HEART RATE: 96 BPM | TEMPERATURE: 96.8 F | SYSTOLIC BLOOD PRESSURE: 115 MMHG | RESPIRATION RATE: 20 BRPM | WEIGHT: 248 LBS | OXYGEN SATURATION: 96 % | DIASTOLIC BLOOD PRESSURE: 75 MMHG | HEIGHT: 66 IN | BODY MASS INDEX: 39.86 KG/M2

## 2025-03-24 DIAGNOSIS — I50.22 CHRONIC HFREF (HEART FAILURE WITH REDUCED EJECTION FRACTION): Primary | ICD-10-CM

## 2025-03-24 PROCEDURE — G0463 HOSPITAL OUTPT CLINIC VISIT: HCPCS

## 2025-03-24 RX ORDER — LISINOPRIL 10 MG/1
10 TABLET ORAL DAILY
Qty: 30 TABLET | Refills: 1 | Status: SHIPPED | OUTPATIENT
Start: 2025-03-24 | End: 2025-03-27

## 2025-03-24 NOTE — PROGRESS NOTES
[facility] Heart Failure Clinic Consultation Note    Kenji Pizarro  [id number]  2025    Primary Care Provider: Provider, No Known   Referring Provider: Kerley, Brian Joseph, DO    Chief Complaint: Initial evaluation    History of Present Illness:   Mr. Kenji Pizarro is a 29 y.o. male who presents to the HF Clinic for initial evaluation.  The patient has a past medical history of hypertension, CKD (has follow-up scheduled with nephrology on 3/27), obesity, THC, and vape use.  He is a some-days smoker.  He recently presented to the ED with SOB at which time he was found to be in heart failure.  BP was initially 180s/130s-140s.  Troponin 77, then 84.  Coronary angiography showed normal arteries.  He was started on GDMT.  He is accompanied by his wife and presents today for evaluation.  The patient declines a .  The patient reports feeling well today.  He denies shortness of breath, cough, and lower extremity edema.  He reports that for about a month before he went to the hospital, he was waking up 3x's/night with SOB, JARRETT, and swelling of both feet.  Difficulty putting shoes on, felt like a knife in his belly when he bent over.  He denies palpitations, dizziness, and syncope.    Past Cardiac Testin. Last Coronary Angio: 3/18/2025  1.  Left dominant system with angiographically normal coronary arteries  2.  Severely elevated LVEDP (38 mmHg)  2. Prior Stress Testing: None  3. Last Echo: 3/17/2025  1.  Mild left ventricular dilation with moderate reduced LV systolic function, LVEF 35-40%.  2.  Mild concentric LVH.  3.  Normal right ventricular size and systolic function.  4.  Grade 1 diastolic dysfunction.  5.  Normal left atrial volume index.  6.  No significant valvular abnormalities.  7.  Small circumferential pericardial effusion without tamponade physiology.  4. Prior Holter Monitor: None      Component  Ref Range & Units (hover) 7 d ago   proBNP 2,235.0 High   "        Component  Ref Range & Units (hover) 7 d ago  (3/17/25) 7 d ago  (3/17/25)   HS Troponin T 84 High Critical  77 High Critical      3/17/2025 CT Angiogram Chest  IMPRESSION:  No evidence of aortic aneurysm, dissection or pulmonary  embolism.      Cardiomegaly with small pericardial and right pleural effusions and  additional findings suggesting some degree of cardiac dysfunction;  further evaluation recommended.    Review of Systems:   Review of Systems  As Noted in HPI.   I have reviewed and confirmed the accuracy of the ROS as documented by the MA/LPN/RN JON Owens    I have reviewed and/or updated the patient's past medical, past surgical, family, social history, problem list and allergies as appropriate.     Medications:     Current Outpatient Medications:     acetaminophen (TYLENOL) 500 MG tablet, Take 1 tablet by mouth Every 6 (Six) Hours As Needed., Disp: , Rfl:     dapagliflozin (FARXIGA) 5 MG tablet tablet, Take 1 tablet by mouth Daily., Disp: 30 tablet, Rfl: 0    furosemide (Lasix) 40 MG tablet, Take 1 tablet by mouth Daily., Disp: 14 tablet, Rfl: 0    metoprolol succinate XL (TOPROL-XL) 50 MG 24 hr tablet, Take 1 tablet by mouth Daily., Disp: 30 tablet, Rfl: 0    sacubitril-valsartan (Entresto) 24-26 MG tablet, Take 1 tablet by mouth 2 (Two) Times a Day., Disp: 60 tablet, Rfl: 0    omeprazole (priLOSEC) 20 MG capsule, Take 1 capsule by mouth Daily. (Patient not taking: Reported on 3/24/2025), Disp: , Rfl:     Physical Exam:  Vital Signs:   Vitals:    03/24/25 1500   BP: 115/75   Pulse: 96   Resp: 20   Temp: 96.8 °F (36 °C)   TempSrc: Infrared   SpO2: 96%  Comment: RA   Weight: 112 kg (248 lb)   Height: 167.6 cm (66\")     Body mass index is 40.03 kg/m².    Physical Exam  Vitals and nursing note reviewed.   Constitutional:       General: He is not in acute distress.     Appearance: He is obese.   HENT:      Head: Normocephalic and atraumatic.   Neck:      Trachea: Trachea normal. "   Cardiovascular:      Rate and Rhythm: Normal rate and regular rhythm.      Pulses: Normal pulses.      Heart sounds: Normal heart sounds. No murmur heard.     No friction rub. No gallop.   Pulmonary:      Effort: Pulmonary effort is normal.      Breath sounds: Normal breath sounds.   Musculoskeletal:      Cervical back: Neck supple.      Right lower leg: No edema.      Left lower leg: No edema.   Skin:     General: Skin is warm and dry.   Neurological:      Mental Status: He is alert and oriented to person, place, and time.   Psychiatric:         Mood and Affect: Mood normal.         Behavior: Behavior normal. Behavior is cooperative.         Thought Content: Thought content does not include suicidal ideation.         Results Review:   I reviewed the patient's new clinical results.    Procedures    Assessment / Plan:   Diagnoses and all orders for this visit:    1. Congestive heart failure, unspecified HF chronicity, unspecified heart failure type (Primary)  Stage C  NYHA Functional Class I  Was just started on metoprolol last week, but after he has been on this for 10-14 days, this could be increased to improve HR  Due to cost containment issues (no insurance, was going to be $400 for Entresto with a coupon card)  Patient can stop this and start lisinopril after the washout period  Patient can stop Farxiga after his Rx runs out.  This is not affordable for him to pay out-of-pocket.  Could consider MRA at some point--defer for now        Patient Instructions   Weigh yourself in the morning after you use the bathroom in the same amount of clothes each day.  If you gain >2-3 lbs overnight or >5 lbs in a week's time, this could be a sign you are in heart failure.  Call your kidney doctor or cardiologist if you gain this weight.    After you weigh yourself, take all your morning medications.  30-60 mins after this, record your blood pressure and heart rate.    STOP ENTRESTO STARTING TODAY.      ON THURSDAY MORNING,  START LISINOPRIL.  (TAKING LISINOPRIL AND ENTRESTO TOGETHER CAN CAUSE KIDNEY FAILURE, SO MAKE SURE YOU HOLD ENTRESTO STARTING NOW AND START LISINOPRIL THURSDAY MORNING)    YOU CAN TAKE FARXIGA UNTIL IT RUNS OUT, BUT THIS IS ANOTHER MEDICATION THAT IS REALLY EXPENSIVE.  WHILE YOU'RE TAKING THIS, MAKE SURE YOU DON'T HAVE ANY UTI SYMPTOMS (BURNING WITH URINATION, BLOOD IN URINE, PAINFUL URINATION, DARK SMELLY URINE).  IF YOU HAVE ANY OF THESE, STOP FARXIGA AND SEE PCP OR URGENT CARE AS SOON AS POSSIBLE.    TODAY WE DISCUSSED POSSIBLY STARTING SPIRONOLACTONE AT SOME POINT IN THE FUTURE, BUT GIVEN THE UNCERTAINTY OF THE COST WITH THIS, WE CAN REVISIT THIS ISSUE.  THIS IS A HEART FAILURE DRUG AND WOULD BENEFIT YOU, BUT THIS REQUIRES LABS TO BE DRAWN ONE WEEK AFTER THIS MEDICATION IS STARTED.      Keep your follow-up appointments with primary care, cardiologist, and nephrologist.  To limit your out-of-pocket cost, you can follow-up with cardiologist for ongoing care.         Preventative Cardiology:   Tobacco Cessation: Cessation Counseling Provided    Advance Care Planning: ACP discussion was declined by the patient. Patient does not have an advance directive, declines further assistance.           Thank you for allowing me to participate in the care of your patient. Please to not hesitate to contact me with additional questions or concerns.     JON Shelton

## 2025-03-24 NOTE — PATIENT INSTRUCTIONS
Weigh yourself in the morning after you use the bathroom in the same amount of clothes each day.  If you gain >2-3 lbs overnight or >5 lbs in a week's time, this could be a sign you are in heart failure.  Call your kidney doctor or cardiologist if you gain this weight.    After you weigh yourself, take all your morning medications.  30-60 mins after this, record your blood pressure and heart rate.    STOP ENTRESTO STARTING TODAY.      ON THURSDAY MORNING, START LISINOPRIL.  (TAKING LISINOPRIL AND ENTRESTO TOGETHER CAN CAUSE KIDNEY FAILURE, SO MAKE SURE YOU HOLD ENTRESTO STARTING NOW AND START LISINOPRIL THURSDAY MORNING)    YOU CAN TAKE FARXIGA UNTIL IT RUNS OUT, BUT THIS IS ANOTHER MEDICATION THAT IS REALLY EXPENSIVE.  WHILE YOU'RE TAKING THIS, MAKE SURE YOU DON'T HAVE ANY UTI SYMPTOMS (BURNING WITH URINATION, BLOOD IN URINE, PAINFUL URINATION, DARK SMELLY URINE).  IF YOU HAVE ANY OF THESE, STOP FARXIGA AND SEE PCP OR URGENT CARE AS SOON AS POSSIBLE.    TODAY WE DISCUSSED POSSIBLY STARTING SPIRONOLACTONE AT SOME POINT IN THE FUTURE, BUT GIVEN THE UNCERTAINTY OF THE COST WITH THIS, WE CAN REVISIT THIS ISSUE.  THIS IS A HEART FAILURE DRUG AND WOULD BENEFIT YOU, BUT THIS REQUIRES LABS TO BE DRAWN ONE WEEK AFTER THIS MEDICATION IS STARTED.      Keep your follow-up appointments with primary care, cardiologist, and nephrologist.  To limit your out-of-pocket cost, you can follow-up with cardiologist for ongoing care.

## 2025-03-25 NOTE — PROGRESS NOTES
Ambulatory Care Clinic  Heart Failure Pharmacist Note     Patient Name: Kenji Pizarro  :1995  Age: 29 y.o.  Sex: male  Referring Provider: Kerley, Brian Joseph, DO   Primary Cardiologist: Establishing with Kacy Knutson on 3/26/25  Encounter Provider:  JON Owens    HPI: Patient presents for initial evaluation in heart failure clinic for management of HFrEF, (EF = 35-40%). PMH otherwise significant for newly diagnosed CHF. Patient presented to [facility] ED after waking up with nose bleed and bleeding in his mouth. On the way to the ED, they stopped due to chest pain. Upon admission found patient's troponin was elevated and BNP were elevated, HTN Emergency, crackles were found in lung, and patient reported significant weight gain. ECHO found EF of 35-40%.     Pt reports that he consumes ~36 ounces of alcohol about 3 times/week. 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 Does not qualify for patient assistance program. Patient will also stop Entresto today and begin Lisinopril on Thursday morning. Will plan to proceed with this to avoid gap in therapy when patient runs out of Entresto. No PAP or coupons that the patient qualifies for.  Discussed washout period in depth. Will also increase Metoprolol XL from 50mg to 100mg following visit today. He denies chest pain, dizziness, palpitations, lightheadedness, abdominal or lower extremity swelling. Patient's lungs were clear on exam. No swelling. He does continue to have nosebleeds.    Heart Failure GDMT:    Drug Class   Drug   Dose Last Dose Adjustment   Notes   ACEi/ARB/ARNI Lisinopril  10mg  Change from Entresto due to cost   Beta Blocker Metoprolol XL 100mg 3/25/25    MRA --   Defer until establish care with cardiology or nephrology.   SGLT2i Farxiga   Continue until pt runs out. Will be $400/ mo. Will not continue     Other CV Meds:  Furosemide 40mg QD  HCTZ 12.5mg daily    Medication  Use:  Adherence:   Past hx of medication use/intolerance:  Affordability: Patient does not apply given no Rx insurance or medical insurance    Social Determinants:  Social Drivers of Health     Tobacco Use: High Risk (3/25/2025)    Received from [facility]    Patient History     Smoking Tobacco Use: Some Days     Smokeless Tobacco Use: Never     Passive Exposure: Current   Alcohol Use: Alcohol Misuse (3/17/2025)    AUDIT-C     Frequency of Alcohol Consumption: 2-3 times a week     Average Number of Drinks: 5 or 6     Frequency of Binge Drinking: Weekly   Financial Resource Strain: Low Risk  (3/17/2025)    Overall Financial Resource Strain (CARDIA)     Difficulty of Paying Living Expenses: Not very hard   Food Insecurity: No Food Insecurity (3/17/2025)    Hunger Vital Sign     Worried About Running Out of Food in the Last Year: Never true     Ran Out of Food in the Last Year: Never true   Transportation Needs: No Transportation Needs (3/17/2025)    PRAPARE - Transportation     Lack of Transportation (Medical): No     Lack of Transportation (Non-Medical): No   Physical Activity: Inactive (3/17/2025)    Exercise Vital Sign     Days of Exercise per Week: 0 days     Minutes of Exercise per Session: 0 min   Stress: No Stress Concern Present (3/17/2025)    [employer] - Occupational Stress Questionnaire     Feeling of Stress : Not at all   Social Connections: Not At Risk (3/17/2025)    Family and Community Support     Help with Day-to-Day Activities: I don't need any help     Lonely or Isolated: Never   Interpersonal Safety: Not At Risk (3/17/2025)    Abuse Screen     Unsafe at Home or Work/School: no     Feels Threatened by Someone?: no     Does Anyone Keep You from Contacting Others or Doint Things Outside the Home?: no     Physical Sign of Abuse Present: no   Depression: Not at risk (3/17/2025)    PHQ-2     PHQ-2 Score: 0   Housing Stability: Not At Risk (3/17/2025)    Housing  "Stability     Current Living Arrangements: home     Potentially Unsafe Housing Conditions: none   Utilities: Not At Risk (3/17/2025)    [facility] Utilities     Threatened with loss of utilities: No   Health Literacy: Not At Risk (3/17/2025)    Education     Help with school or training?: No     Preferred Language: English   Employment: Not At Risk (3/17/2025)    Employment     Do you want help finding or keeping work or a job?: I do not need or want help   Disabilities: Not At Risk (3/17/2025)    Disabilities     Concentrating, Remembering, or Making Decisions Difficulty: no     Doing Errands Independently Difficulty: no       Immunization Status:  Pneumococcal:   Influenza:   COVID-19:     OBJECTIVE:  /75   Pulse 96   Temp 96.8 °F (36 °C) (Infrared)   Resp 20   Ht 167.6 cm (66\")   Wt 112 kg (248 lb)   SpO2 96% Comment: RA  BMI 40.03 kg/m²     Body mass index is 40.03 kg/m².  Wt Readings from Last 1 Encounters:   03/24/25 112 kg (248 lb)       10-yr ASCVD risk: The ASCVD Risk score (Medhat FRASER, et al., 2019) failed to calculate for the following reasons:    The 2019 ASCVD risk score is only valid for ages 40 to 79    Lab Review:  Renal Function: Estimated Creatinine Clearance: 110.4 mL/min (by C-G formula based on SCr of 1.14 mg/dL).    Lab Results   Component Value Date    PROBNP 2,235.0 (H) 03/17/2025       Results for orders placed during the hospital encounter of 03/17/25    Adult Transthoracic Echo Complete With Contrast if Necessary Per Protocol    Interpretation Summary  1.  Mild left ventricular dilation with moderate reduced LV systolic function, LVEF 35-40%.  2.  Mild concentric LVH.  3.  Normal right ventricular size and systolic function.  4.  Grade 1 diastolic dysfunction.  5.  Normal left atrial volume index.  6.  No significant valvular abnormalities.  7.  Small circumferential pericardial effusion without tamponade physiology.        6 MINUTE WALK                      Does patient have a home " "BP monitor? No  Does patient have a home scale? No    Patient Education:  Kenji Pizarro was provided written and verbal education during their clinic visit today. Topics reviewed include:  The basics of heart failure (defining heart failure, ejection fraction, stages of disease)  Signs and symptoms of heart failure, self track zones, and when to contact clinic or seek emergency care  Overview of heart failure treatment plan (pharmacologic and non-pharmacologic) and goals of treatment  Tips for success with managing heart failure medications  List of medications that may be used to treat heart failure and how they work in the body    Patient was given a heart failure tracker calendar to document weight, blood pressure, and symptoms/overall feeling each day. Patient was encouraged to complete this daily in order to help guide therapy adjustments. Kenji Pizarro expressed understanding.    Kenji Pizarro was provided written and verbal education during their clinic visit today. Topics reviewed include:  Dietary modifications that can improve heart health  Quick tips for limiting sodium intake (<2000 mg/day), including foods to look for at the grocery store and making smart choices when eating out  \"[employer]\"  How to read a nutrition label  Managing fluid intake (<1500 mL/day)  Reinforced education provided at previous visit     ASSESSMENT/PLAN:  HFrEF (EF = 35-40%)  Due to lack of insurance, patient will follow with PCP, Cardiology and Nephrology to consolidate costs.  Increase Metoprolol XL 100mg  Stop Entresto 24/26mg due to cost today.  Begin Lisinopril 10mg on Thursday morning following washout period of Entresto.  Patient will continue Farxiga until he runs out of prescription. Unable to use PAP or coupon due to lack of insurance.  Defer Spironolactone until establish care with nephrology or cardiology to ensure appropriate follow up.   Patient is to follow up with Nephrology on 3/25 and PCP on 3/25 and " Cardiology on 3/26. Will defer further Furosemide dosing and monitoring with provider patient plans to continue with.   Advised patient to call clinic with 2-3 lb weight gain in 24 hrs or >5 lb weight gain in 1 week.  Patient will begin to monitor BP, HR and Weight. He is advised to take log to each doctor's appointment.   Patient declined  at today's visit.         Lizette Pinto, PharmD  [facility]  03/25/25  11:39 EDT

## 2025-03-27 ENCOUNTER — TELEPHONE (OUTPATIENT)
Dept: PHARMACY | Facility: HOSPITAL | Age: 30
End: 2025-03-27

## 2025-03-27 ENCOUNTER — OFFICE VISIT (OUTPATIENT)
Dept: CARDIOLOGY | Facility: CLINIC | Age: 30
End: 2025-03-27

## 2025-03-27 VITALS
SYSTOLIC BLOOD PRESSURE: 158 MMHG | BODY MASS INDEX: 39.08 KG/M2 | RESPIRATION RATE: 21 BRPM | OXYGEN SATURATION: 98 % | HEART RATE: 112 BPM | HEIGHT: 66 IN | WEIGHT: 243.2 LBS | DIASTOLIC BLOOD PRESSURE: 118 MMHG

## 2025-03-27 DIAGNOSIS — I50.22 CHRONIC HFREF (HEART FAILURE WITH REDUCED EJECTION FRACTION): Primary | ICD-10-CM

## 2025-03-27 DIAGNOSIS — E66.9 OBESITY (BMI 30-39.9): ICD-10-CM

## 2025-03-27 DIAGNOSIS — N18.2 CKD (CHRONIC KIDNEY DISEASE) STAGE 2, GFR 60-89 ML/MIN: ICD-10-CM

## 2025-03-27 DIAGNOSIS — I10 ESSENTIAL HYPERTENSION: ICD-10-CM

## 2025-03-27 RX ORDER — LISINOPRIL 20 MG/1
20 TABLET ORAL DAILY
Qty: 30 TABLET | Refills: 2 | Status: SHIPPED | OUTPATIENT
Start: 2025-03-27 | End: 2025-06-25

## 2025-03-27 NOTE — TELEPHONE ENCOUNTER
There were some discrepancies on nephrology note as to what the patient was taking at discharge and what changes were made in the office, so I faxed a copy of hospital d/c note and HF clinic note to nephrology in the event these records hadn't populated in Epic.

## 2025-03-27 NOTE — PROGRESS NOTES
[facility]    Office Consult     Kenji Pizarro  [id number]  2025    Referred By: No ref. provider found    Chief Complaint   Patient presents with    Follow-up    Shortness of Breath       Subjective     History of Present Illness:   Kenji Pizarro is a 29 y.o. male who presents to the Cardiology Clinic for follow up after a recent hospitalization 3/17/2025.  The patient has a past medical history of hypertension, CKD, obesity, THC, and vape use. Smokes cigarettes on some days.  He presented to the emergency department with shortness of breath and was found to be in heart failure. Coronary angiography showed normal arteries. He was started on guideline directed therapy. His EF on his echocardiogram was noted to be 35-40%, moderate global LV hypokinesis .  He had a coronary angiography which noted near normal arteries.  Heart failure secondary to nonischemic cardiomyopathy, potentially related to poorly controlled hypertension.  He was discharged on Entresto, Metoprolol, Lasix, and Farxiga.  He followed up with the heart failure clinic 3/24/202 and then Dr. Juan yesterday.  His medications were changed at the heart failure clinic to try to maximize medications for GDMT and his blood pressure was noted to be elevated yesterday at Nephrology follow up.  Renal function panel was drawn yesterday and normal.  He states his shortness of breath is much improved.  Denies any chest pain, swelling in legs, or palpitations.  He plans to buy a scale and blood pressure cuff.  Discussed to check BP, weight, HR daily.  He advises that his diet was discussed in detail to avoid salt.    Past Cardiac Testin. Last Coronary Angio: 3/18/2025  1.  Left dominant system with angiographically normal coronary arteries  2.  Severely elevated LVEDP (38 mmHg)  2. Prior Stress Testing: None  3. Last Echo: 3/17/2025  1.  Mild left ventricular dilation with moderate reduced LV systolic function, LVEF  35-40%.  2.  Mild concentric LVH.  3.  Normal right ventricular size and systolic function.  4.  Grade 1 diastolic dysfunction.  5.  Normal left atrial volume index.  6.  No significant valvular abnormalities.  7.  Small circumferential pericardial effusion without tamponade physiology.  4. Prior Holter Monitor: None    Review of Systems   Constitutional:  Negative for activity change and fatigue.   Respiratory:  Negative for chest tightness and shortness of breath.    Cardiovascular:  Negative for chest pain, palpitations and leg swelling.   Neurological:  Negative for dizziness.   All other systems reviewed and are negative.       History reviewed. No pertinent past medical history.    Past Surgical History:   Procedure Laterality Date    CARDIAC CATHETERIZATION N/A 3/18/2025    Procedure: Coronary angiography;  Surgeon: Jose Rainey MD;  Location: [facility] CATH INVASIVE LOCATION;  Service: Cardiovascular;  Laterality: N/A;       Family History   Problem Relation Age of Onset    Hyperlipidemia Father     Hypertension Mother     Diabetes Mother     No Known Problems Sister     No Known Problems Sister     Diabetes Brother     No Known Problems Brother     No Known Problems Son        Social History     Socioeconomic History    Marital status: Single   Tobacco Use    Smoking status: Some Days     Types: Cigarettes     Passive exposure: Never    Smokeless tobacco: Never    Tobacco comments:     1 cigarette every 2-3 months   Vaping Use    Vaping status: Never Used    Passive vaping exposure: Yes   Substance and Sexual Activity    Alcohol use: Defer    Drug use: Never    Sexual activity: Yes     Partners: Female         Current Outpatient Medications:     acetaminophen (TYLENOL) 500 MG tablet, Take 1 tablet by mouth Every 6 (Six) Hours As Needed., Disp: , Rfl:     dapagliflozin (FARXIGA) 5 MG tablet tablet, Take 1 tablet by mouth Daily., Disp: 30 tablet, Rfl: 0    furosemide (Lasix) 40 MG tablet, Take 1 tablet by  "mouth Daily., Disp: 14 tablet, Rfl: 0    metoprolol succinate XL (TOPROL-XL) 50 MG 24 hr tablet, Take 1 tablet by mouth Daily., Disp: 30 tablet, Rfl: 0    lisinopril (PRINIVIL,ZESTRIL) 20 MG tablet, Take 1 tablet by mouth Daily for 90 days., Disp: 30 tablet, Rfl: 2    No Known Allergies    Objective     Vitals:    03/27/25 0824   BP: (!) 158/118   BP Location: Left arm   Patient Position: Sitting   Cuff Size: Adult   Pulse: 112   Resp: 21   SpO2: 98%   Weight: 110 kg (243 lb 3.2 oz)   Height: 167.6 cm (66\")     Body mass index is 39.25 kg/m².    Physical Exam  Vitals and nursing note reviewed.   Constitutional:       General: He is not in acute distress.     Appearance: Normal appearance. He is not toxic-appearing.   HENT:      Head: Normocephalic.      Mouth/Throat:      Mouth: Mucous membranes are moist.   Eyes:      Pupils: Pupils are equal, round, and reactive to light.   Cardiovascular:      Rate and Rhythm: Normal rate and regular rhythm. Tachycardia present.      Pulses: Normal pulses.      Heart sounds: Normal heart sounds. No murmur heard.  Pulmonary:      Effort: Pulmonary effort is normal.      Breath sounds: Normal breath sounds. No wheezing, rhonchi or rales.   Musculoskeletal:      Right lower leg: No edema.      Left lower leg: No edema.   Skin:     General: Skin is warm and dry.      Capillary Refill: Capillary refill takes less than 2 seconds.   Neurological:      Mental Status: He is alert and oriented to person, place, and time. Mental status is at baseline.   Psychiatric:         Mood and Affect: Mood normal.         Behavior: Behavior normal.         Thought Content: Thought content normal.         Results Review:   I reviewed the patient's new clinical results.    Procedures    Assessment & Plan  Chronic HFrEF (heart failure with reduced ejection fraction)  -Continue Metoprolol, Lisinopril, and Farxiga  -Renal function panel 3/25 stable  -Euvolemic on exam today with improved shortness of " breath  -Instructed to weigh daily  -Limit salt in diet  -Increasing Lisinopril to 20mg today with noted hypertension  -Follow up with Dr. Rainey in 6-8 weeks for recheck  Orders:    Ambulatory Referral to Family Practice    CKD (chronic kidney disease) stage 2, GFR 60-89 ml/min  -Creatinine 1.1 with labs 3/25/2025  -Renal function panel stable 3/25  -Being followed by Nephrology    Orders:    Ambulatory Referral to Family Practice    Essential hypertension  -Blood pressure elevated today  -Will go ahead and increase Lisinopril to 20mg  -Discussed to check blood pressure daily and obtain cuff today  -Call office with continued elevated readings       Obesity (BMI 30-39.9)  -BMI-39  -Recommend diet and exercise for weight loss    Orders:    Ambulatory Referral to Family Practice      Preventative Cardiology:   Tobacco Cessation: Cessation Counseling Provided    Advance Care Planning: ACP discussion was held with the patient during this visit. Patient does not have an advance directive, declines further assistance.     Follow Up:   Return in about 6 weeks (around 5/8/2025).      Thank you for allowing me to participate in the care of your patient. Please to not hesitate to contact me with additional questions or concerns.     JON Arevalo

## 2025-03-27 NOTE — TELEPHONE ENCOUNTER
Following his cardiology appointment today with Kacy Knutson, I called to confirm with patient that he stopped Entresto on Monday afternoon after his appointment and started Lisinopril 10mg Thursday morning. Pt voiced this was completed. He will increase Lisinopril to 20mg as directed by Kacy Knutson.     Kacy Chavarria and Matilde

## 2025-03-27 NOTE — ASSESSMENT & PLAN NOTE
-Continue Metoprolol, Lisinopril, and Farxiga  -Renal function panel 3/25 stable  -Euvolemic on exam today with improved shortness of breath  -Instructed to weigh daily  -Limit salt in diet  -Increasing Lisinopril to 20mg today with noted hypertension  -Follow up with Dr. Rainey in 6-8 weeks for recheck  Orders:    Ambulatory Referral to Family Practice

## 2025-03-27 NOTE — PATIENT INSTRUCTIONS
Increase Lisinopril to 20mg daily    Check blood pressure, heart rate, and weigh yourself daily.    Limit Salt in your diet.

## 2025-04-08 ENCOUNTER — OFFICE VISIT (OUTPATIENT)
Dept: FAMILY MEDICINE CLINIC | Facility: CLINIC | Age: 30
End: 2025-04-08

## 2025-04-08 VITALS
SYSTOLIC BLOOD PRESSURE: 136 MMHG | BODY MASS INDEX: 37.98 KG/M2 | HEIGHT: 67 IN | RESPIRATION RATE: 18 BRPM | DIASTOLIC BLOOD PRESSURE: 84 MMHG | WEIGHT: 242 LBS | HEART RATE: 114 BPM | OXYGEN SATURATION: 96 %

## 2025-04-08 DIAGNOSIS — R00.0 TACHYCARDIA: ICD-10-CM

## 2025-04-08 DIAGNOSIS — I10 PRIMARY HYPERTENSION: Primary | ICD-10-CM

## 2025-04-08 DIAGNOSIS — Z76.89 ENCOUNTER TO ESTABLISH CARE WITH NEW DOCTOR: ICD-10-CM

## 2025-04-08 DIAGNOSIS — Z13.220 SCREENING FOR LIPID DISORDERS: ICD-10-CM

## 2025-04-08 DIAGNOSIS — Z13.1 SCREENING FOR DIABETES MELLITUS: ICD-10-CM

## 2025-04-08 DIAGNOSIS — I50.9 ACUTE CONGESTIVE HEART FAILURE, UNSPECIFIED HEART FAILURE TYPE: ICD-10-CM

## 2025-04-08 DIAGNOSIS — Z23 NEED FOR TDAP VACCINATION: ICD-10-CM

## 2025-04-08 DIAGNOSIS — Z00.00 ANNUAL PHYSICAL EXAM: ICD-10-CM

## 2025-04-08 RX ORDER — LISINOPRIL 20 MG/1
20 TABLET ORAL DAILY
Qty: 30 TABLET | Refills: 2 | Status: SHIPPED | OUTPATIENT
Start: 2025-04-08 | End: 2025-07-07

## 2025-04-08 RX ORDER — DAPAGLIFLOZIN 5 MG/1
5 TABLET, FILM COATED ORAL DAILY
Qty: 30 TABLET | Refills: 0 | Status: CANCELLED | OUTPATIENT
Start: 2025-04-08

## 2025-04-08 RX ORDER — FUROSEMIDE 40 MG/1
40 TABLET ORAL DAILY
Qty: 14 TABLET | Refills: 0 | Status: CANCELLED | OUTPATIENT
Start: 2025-04-08

## 2025-04-08 RX ORDER — ACETAMINOPHEN 500 MG
500 TABLET ORAL EVERY 6 HOURS PRN
Qty: 30 TABLET | Refills: 0 | Status: CANCELLED | OUTPATIENT
Start: 2025-04-08

## 2025-04-08 RX ORDER — METOPROLOL SUCCINATE 100 MG/1
100 TABLET, EXTENDED RELEASE ORAL DAILY
Qty: 30 TABLET | Refills: 0 | Status: SHIPPED | OUTPATIENT
Start: 2025-04-08 | End: 2025-05-08

## 2025-04-08 RX ORDER — METOPROLOL SUCCINATE 50 MG/1
50 TABLET, EXTENDED RELEASE ORAL
Qty: 30 TABLET | Refills: 0 | Status: CANCELLED | OUTPATIENT
Start: 2025-04-08

## 2025-04-08 NOTE — PROGRESS NOTES
New Patient Office Visit/Hospital DC Follow Up     Date: 2025  Patient Name: Kenji Pizarro  : 1995   MRN: [id number]     Chief Complaint:    Chief Complaint   Patient presents with    Establish Care     [facility] D/C 3/19 CHF, hypoxia ,edema       History of Present Illness: Kenji Pizarro is a 30 y.o. male who is here today to establish care with regular family physician for preventative care as well as follow up regarding recent hospitalization.    Patient reports that he was recently hospitalized for hypertensive urgency and acute systolic heart failure. Patient reports that he has seen cardiology since discharge but does not have follow with them yet. Patient reports that he was switched to lisinopril at cardiologist office previously from [employer] to pay. Patient reports that he does have enough medication for today but will be out tomorrow.    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      Subjective     Past Medical History: History reviewed. No pertinent past medical history.    Past Surgical History:   Past Surgical History:   Procedure Laterality Date    CARDIAC CATHETERIZATION N/A 3/18/2025    Procedure: Coronary angiography;  Surgeon: Jose Rainey MD;  Location: [facility] INVASIVE LOCATION;  Service: Cardiovascular;  Laterality: N/A;       Family History:   Family History   Problem Relation Age of Onset    Hyperlipidemia Father     Hypertension Mother     Diabetes Mother     No Known Problems Sister     No Known Problems Sister     Diabetes Brother     No Known Problems Brother     No Known Problems Son        Social History:   Social History     Socioeconomic History    Marital status: Single   Tobacco Use    Smoking status: Former     Types: Cigarettes     Start date:      Quit date:      Years since quittin.2     Passive exposure: Never    Smokeless tobacco: Never    Tobacco comments:     1 cigarette every 2-3 months   Vaping Use    Vaping status: Never  "Used    Passive vaping exposure: Yes   Substance and Sexual Activity    Alcohol use: Defer    Drug use: Never    Sexual activity: Yes     Partners: Female       Medications:     Current Outpatient Medications:     acetaminophen (TYLENOL) 500 MG tablet, Take 1 tablet by mouth Every 6 (Six) Hours As Needed., Disp: , Rfl:     dapagliflozin (FARXIGA) 5 MG tablet tablet, Take 1 tablet by mouth Daily., Disp: 30 tablet, Rfl: 0    furosemide (Lasix) 40 MG tablet, Take 1 tablet by mouth Daily., Disp: 14 tablet, Rfl: 0    lisinopril (PRINIVIL,ZESTRIL) 20 MG tablet, Take 1 tablet by mouth Daily for 90 days., Disp: 30 tablet, Rfl: 2    metoprolol succinate XL (Toprol XL) 100 MG 24 hr tablet, Take 1 tablet by mouth Daily for 30 days., Disp: 30 tablet, Rfl: 0    Allergies:   No Known Allergies    Objective     Physical Exam:  Vital Signs:   Vitals:    04/08/25 1059   BP: 136/84   Pulse: 114   Resp: 18   SpO2: 96%   Weight: 110 kg (242 lb)   Height: 170.2 cm (67\")     Body mass index is 37.9 kg/m².   Facility age limit for growth %yoselin is 20 years.         Physical Exam    General:  Adult male in no acute distress. Alert and oriented. Vitals reviewed: HR elevated at 115. Repeat remains elevated at 110.  Head/ENT: Atraumatic. No facial/sinus tenderness to palpation. Tympanic membranes are normal bilaterally with normal appearing auditory canals. Oral cavity unremarkable for acute findings. Mallampati IV.  Neck: Anatomy appears symmetrical. There is no palpable lymphadenopathy to palpation.  Cardiac: HR elevated at 115. Repeat remains elevated at 110 during physical exam. Regular auscultation. I detect no obvious murmurs or additional heart sounds during exam.  Pulmonary: Lungs are clear to auscultation bilaterally.  Abdomen: Normal appearing abdomen. Normal bowel sounds to auscultation. Non-tender to palpation of upper and lower abdominal quadrants bilaterally.  Extremities: Upper extremities demonstrate preserved range of motion. " There appears to be no evidence of edema bilaterally. Lower extremities demonstrate preserved range of motion - both passive and active. There is no appreciated lower extremity edema bilaterally to palpation.  Integumentary/Skin: Skin appears unremarkable from observable skin surfaces.   Neurological: Cranial nerves appear grossly intact. Cerebellar function appears preserved. Motor function preserved in bilateral upper and lower extremities. Sensation preserved in face/head and bilateral upper and lower extremities. Normal gait and speech.  Behavioral/Psych: Patient behavior/demeanor appears consistent with reported age. Patient is pleasant with normal affect today.      Procedures      Assessment / Plan      1. Encounter to establish care with new doctor    2. Primary hypertension  - lisinopril (PRINIVIL,ZESTRIL) 20 MG tablet; Take 1 tablet by mouth Daily for 90 days.  Dispense: 30 tablet; Refill: 2  - metoprolol succinate XL (Toprol XL) 100 MG 24 hr tablet; Take 1 tablet by mouth Daily for 30 days.  Dispense: 30 tablet; Refill: 0  - CBC w AUTO Differential  - Comprehensive metabolic panel    3. Tachycardia  - metoprolol succinate XL (Toprol XL) 100 MG 24 hr tablet; Take 1 tablet by mouth Daily for 30 days.  Dispense: 30 tablet; Refill: 0  - CBC w AUTO Differential  - Comprehensive metabolic panel  - TSH Rfx On Abnormal To Free T4    4. Acute congestive heart failure, unspecified heart failure type    5. Annual physical exam    6. Screening for diabetes mellitus  - Hemoglobin A1c    7. Screening for lipid disorders  - Lipid panel    8. Need for Tdap vaccination  - Tdap Vaccine Greater Than or Equal To 6yo IM    Assessment & Plan  1. Primary hypertension:  - Currently on lisinopril after switch from Entresto by cardiology due to self-pay status  - Refill current dosage of lisinopril 20 mg  - Increase dosage of metoprolol to 100 mg related to tachycardia, will aid with hypertension    2. Tachycardia:  - Heart rate  remains elevated today  - Increase dosage of metoprolol to 100 mg and reassess    3. Acute systolic heart failure:  - Thought to be secondary to uncontrolled blood pressure in hospital setting  - Underwent cardiac workup and is under care of cardiologist:    -EF 35-40% with mild concentric LVH. Grade 1 Diastolic Dysfunction.  - Unable to afford Entresto or Farxiga. Currently on Lisinopril and Metoprolol.  - Schedule follow-up appointment with Cardiology as there is no current appointment    4. Obstructive sleep apnea:  - Reports snoring, Mallampati score suggests potential sleep apnea  - Discussed relation to blood pressure and heart failure  - Consider sleep medicine referral and sleep study  - Provide cost information before next visit due to self-pay status    5. Health maintenance:  - Annual physical examination largely unremarkable other than Mallampati score  - Obtain screening/surveillance labs including CBC, CMP, TSH, lipid panel, and A1c  - Works in construction. Planned for Tdap though left before receiving. Will provide next visit.      Patient or patient representative verbalized consent for the use of Ambient Listening during the visit with  Kaleb Gaitan MD for chart documentation. 4/8/2025  13:06 EDT     Follow Up:   Return in about 2 weeks (around 4/22/2025).      MD MARIANNE Fernandes PC [facility]  [address] [place] [address] [address] [place] [address]  Fax [phone]  Phone [phone]

## 2025-04-09 LAB
ALBUMIN SERPL-MCNC: 4.2 G/DL (ref 3.5–5.2)
ALBUMIN/GLOB SERPL: 1.1 G/DL
ALP SERPL-CCNC: 88 U/L (ref 39–117)
ALT SERPL-CCNC: 41 U/L (ref 1–41)
AST SERPL-CCNC: 25 U/L (ref 1–40)
BASOPHILS # BLD AUTO: 0.11 10*3/MM3 (ref 0–0.2)
BASOPHILS NFR BLD AUTO: 1.1 % (ref 0–1.5)
BILIRUB SERPL-MCNC: 0.9 MG/DL (ref 0–1.2)
BUN SERPL-MCNC: 13 MG/DL (ref 6–20)
BUN/CREAT SERPL: 13.3 (ref 7–25)
CALCIUM SERPL-MCNC: 9.7 MG/DL (ref 8.6–10.5)
CHLORIDE SERPL-SCNC: 103 MMOL/L (ref 98–107)
CHOLEST SERPL-MCNC: 185 MG/DL (ref 0–200)
CO2 SERPL-SCNC: 23.6 MMOL/L (ref 22–29)
CREAT SERPL-MCNC: 0.98 MG/DL (ref 0.76–1.27)
EGFRCR SERPLBLD CKD-EPI 2021: 105.1 ML/MIN/1.73
EOSINOPHIL # BLD AUTO: 0.57 10*3/MM3 (ref 0–0.4)
EOSINOPHIL NFR BLD AUTO: 5.6 % (ref 0.3–6.2)
ERYTHROCYTE [DISTWIDTH] IN BLOOD BY AUTOMATED COUNT: 14.4 % (ref 12.3–15.4)
GLOBULIN SER CALC-MCNC: 3.7 GM/DL
GLUCOSE SERPL-MCNC: 91 MG/DL (ref 65–99)
HBA1C MFR BLD: 6 % (ref 4.8–5.6)
HCT VFR BLD AUTO: 54.9 % (ref 37.5–51)
HDLC SERPL-MCNC: 35 MG/DL (ref 40–60)
HGB BLD-MCNC: 18 G/DL (ref 13–17.7)
IMM GRANULOCYTES # BLD AUTO: 0.03 10*3/MM3 (ref 0–0.05)
IMM GRANULOCYTES NFR BLD AUTO: 0.3 % (ref 0–0.5)
LDLC SERPL CALC-MCNC: 118 MG/DL (ref 0–100)
LYMPHOCYTES # BLD AUTO: 2.35 10*3/MM3 (ref 0.7–3.1)
LYMPHOCYTES NFR BLD AUTO: 23.2 % (ref 19.6–45.3)
MCH RBC QN AUTO: 28 PG (ref 26.6–33)
MCHC RBC AUTO-ENTMCNC: 32.8 G/DL (ref 31.5–35.7)
MCV RBC AUTO: 85.4 FL (ref 79–97)
MONOCYTES # BLD AUTO: 0.62 10*3/MM3 (ref 0.1–0.9)
MONOCYTES NFR BLD AUTO: 6.1 % (ref 5–12)
NEUTROPHILS # BLD AUTO: 6.44 10*3/MM3 (ref 1.7–7)
NEUTROPHILS NFR BLD AUTO: 63.7 % (ref 42.7–76)
NRBC BLD AUTO-RTO: 0 /100 WBC (ref 0–0.2)
PLATELET # BLD AUTO: 297 10*3/MM3 (ref 140–450)
POTASSIUM SERPL-SCNC: 4.6 MMOL/L (ref 3.5–5.2)
PROT SERPL-MCNC: 7.9 G/DL (ref 6–8.5)
RBC # BLD AUTO: 6.43 10*6/MM3 (ref 4.14–5.8)
SODIUM SERPL-SCNC: 137 MMOL/L (ref 136–145)
TRIGL SERPL-MCNC: 182 MG/DL (ref 0–150)
TSH SERPL DL<=0.005 MIU/L-ACNC: 2.02 UIU/ML (ref 0.27–4.2)
VLDLC SERPL CALC-MCNC: 32 MG/DL (ref 5–40)
WBC # BLD AUTO: 10.12 10*3/MM3 (ref 3.4–10.8)

## 2025-04-11 ENCOUNTER — RESULTS FOLLOW-UP (OUTPATIENT)
Dept: FAMILY MEDICINE CLINIC | Facility: CLINIC | Age: 30
End: 2025-04-11

## 2025-04-15 NOTE — TELEPHONE ENCOUNTER
Dr Gaitan:    Please let patient know that his chemistries are normal.  His thyroid looks good.       His hemoglobin A1c shows that he is prediabetic.  His lipid panel does show elevated triglycerides and bad cholesterol.  I do recommend dietary changes to improve both of these.  May ultimately need medication or medication adjustments to control things.     Regarding patient's blood counts his hemoglobin and hematocrit are somewhat elevated.  Unsure reason for this but would like to get this repeated in the near future if patient agreeable.    Attempted to reach patient regarding results, no answer/ voicemail. HUB ok to relay message. WK

## 2025-04-16 NOTE — TELEPHONE ENCOUNTER
Spoke with patient, notified him of results and recommendations from provider, verified understanding. Keep appt for next week to f/u. Call with questions. WK

## 2025-04-24 ENCOUNTER — OFFICE VISIT (OUTPATIENT)
Dept: FAMILY MEDICINE CLINIC | Facility: CLINIC | Age: 30
End: 2025-04-24

## 2025-04-24 VITALS
SYSTOLIC BLOOD PRESSURE: 156 MMHG | HEART RATE: 98 BPM | HEIGHT: 67 IN | DIASTOLIC BLOOD PRESSURE: 84 MMHG | WEIGHT: 243.2 LBS | OXYGEN SATURATION: 96 % | RESPIRATION RATE: 18 BRPM | BODY MASS INDEX: 38.17 KG/M2

## 2025-04-24 DIAGNOSIS — Z23 NEED FOR TDAP VACCINATION: Primary | ICD-10-CM

## 2025-04-24 DIAGNOSIS — R73.03 PREDIABETES: ICD-10-CM

## 2025-04-24 DIAGNOSIS — G47.33 OBSTRUCTIVE SLEEP APNEA: ICD-10-CM

## 2025-04-24 DIAGNOSIS — E78.2 MIXED HYPERLIPIDEMIA: ICD-10-CM

## 2025-04-24 DIAGNOSIS — R00.0 TACHYCARDIA: ICD-10-CM

## 2025-04-24 DIAGNOSIS — I10 PRIMARY HYPERTENSION: ICD-10-CM

## 2025-04-24 DIAGNOSIS — I50.9 ACUTE CONGESTIVE HEART FAILURE, UNSPECIFIED HEART FAILURE TYPE: ICD-10-CM

## 2025-04-24 RX ORDER — LISINOPRIL 40 MG/1
40 TABLET ORAL DAILY
Qty: 90 TABLET | Refills: 0 | Status: SHIPPED | OUTPATIENT
Start: 2025-04-24

## 2025-04-24 RX ORDER — METOPROLOL SUCCINATE 100 MG/1
100 TABLET, EXTENDED RELEASE ORAL DAILY
Qty: 90 TABLET | Refills: 0 | Status: SHIPPED | OUTPATIENT
Start: 2025-04-24

## 2025-04-24 NOTE — PROGRESS NOTES
"    Follow Up Office Visit      Date: 2025   Patient Name: Kenji Pizarro  : 1995   MRN: [id number]     Chief Complaint:    Chief Complaint   Patient presents with    Hypertension     2 wk follow up, patient reports he has been feeling better, no SOB/CP    Congestive Heart Failure    Rapid Heart Rate       History of Present Illness: Kenji Pizarro is a 30 y.o. male who is here today for follow up.    Patient reports that he has been doing largely well since seen last. Patient reports that he is not having any issues that he is aware of.    Patient reports that he is only taking metoprolol and lisinopril at present. Patient reports that his blood pressure is still running somewhat high at home with top number reaching near 140.    We do discuss patient recent labs.    Subjective     I have reviewed the patients family history, social history, past medical history, past surgical history and have updated it as appropriate.     Medications:     Current Outpatient Medications:     acetaminophen (TYLENOL) 500 MG tablet, Take 1 tablet by mouth Every 6 (Six) Hours As Needed., Disp: , Rfl:     metoprolol succinate XL (Toprol XL) 100 MG 24 hr tablet, Take 1 tablet by mouth Daily., Disp: 90 tablet, Rfl: 0    lisinopril (PRINIVIL,ZESTRIL) 40 MG tablet, Take 1 tablet by mouth Daily., Disp: 90 tablet, Rfl: 0    Allergies:   No Known Allergies    Objective     Physical Exam:     Vital Signs:   Vitals:    25 0830 25 0958   BP: 156/84    Pulse: 111 98   Resp: 18    SpO2: 96%    Weight: 110 kg (243 lb 3.2 oz)    Height: 170.2 cm (67\")      Body mass index is 38.09 kg/m².       Physical Exam     General:  Well appearing adult male in no acute distress. Alert and oriented. Vitals reviewed: Hypertensive with elevated HR though HR improved to 98 on recheck.  Head/ENT: Atraumatic. Mallampati IV.   Neck: Anatomy appears symmetrical.   Cardiac: Tachycardia though improved to 98 on repeat. Regular rhythm to " auscultation.  Pulmonary: Lungs are clear to auscultation bilaterally.  Extremities: There is no appreciated lower extremity edema bilaterally.  Integumentary/Skin: Skin appears unremarkable from observable skin surfaces.  Neurological: Normal gait and speech.  Behavioral/Psych: Patient behavior/demeanor appears consistent with reported age.     Procedures    Assessment / Plan      1. Primary hypertension  - lisinopril (PRINIVIL,ZESTRIL) 40 MG tablet; Take 1 tablet by mouth Daily.  Dispense: 90 tablet; Refill: 0  - metoprolol succinate XL (Toprol XL) 100 MG 24 hr tablet; Take 1 tablet by mouth Daily.  Dispense: 90 tablet; Refill: 0    2. Tachycardia  - metoprolol succinate XL (Toprol XL) 100 MG 24 hr tablet; Take 1 tablet by mouth Daily.  Dispense: 90 tablet; Refill: 0    3. Acute congestive heart failure, unspecified heart failure type    4. Obstructive sleep apnea  - Ambulatory Referral to Sleep Medicine    5. Prediabetes    6. Mixed hyperlipidemia    7. Need for Tdap vaccination  - Tdap Vaccine => 8yo IM (BOOSTRIX/ADACEL)    Assessment & Plan  1. Hypertension/Tachycardia/Heart Failure:  - Blood pressure was elevated during today's office visit.  - Dosage of lisinopril will be increased to 40 mg daily.  - Heart rate was elevated upon arrival but improved to 98 upon re-evaluation.  - Reports experiencing anxiety during doctor visits.  - Current dosage of metoprolol will be maintained and refilled at this time.  - Reassessment will occur at the next visit, with potential dosage adjustments if necessary.  - Heart failure diagnosed at recent hospitalization thought secondary to uncontrolled HTN.   - Underwent cardiac workup and is under care of cardiologist:    -EF 35-40% with mild concentric LVH. Grade 1 Diastolic Dysfunction.    -Unable to afford Entresto or Farxiga. Not current requiring Lasix. Currently on Lisinopril and Metoprolol alone.    - RYLIE likely contributory    2. Obstructive sleep apnea:  - Symptoms of  snoring and physical exam with Mallampati score IV strongly suggest obstructive sleep apnea.  - Likely contributing to hypertension, tachycardia, and heart failure.  - Extensive discussion held regarding the benefits of evaluation by sleep medicine and a sleep study.  - Referral to sleep medicine will be placed today; discussed that the sleep study will potentially coset  between $200 to $500 out of pocket based on estimates inquired about before visit today though have discussed prices could vary significantly and does not include office visit cost.  - Patient does wish to proceed with sleep referral with potentially paying out of pocket    3. Prediabetes:  - Recent labs indicated prediabetes.  - Healthy dietary choices were discussed.  - Will reassess with a new A1c test in the next few months.  - Emphasized the importance of lifestyle modifications to prevent progression to diabetes.    4. Hyperlipidemia:  - Recent labs revealed mixed hyperlipidemia, including elevated LDL and triglycerides.  - Healthy dietary changes were discussed.  - Will reassess lipid levels at the follow-up visit.  - Discussed the potential need for medication if lifestyle changes are insufficient.    5. Elevated hemoglobin/hematocrit:  - Recent labs showed mildly elevated hemoglobin and hematocrit levels.  - Discussed that different conditions can lead to elevated iron levels.  - Given self-pay status, broader workup will be held off and reassessed in the next couple of months.  - If still elevated, will strongly encourage evaluation for alternate etiologies as these can lead to significant morbidities later on. Levels will be repeated in the coming months.    6. 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 4/24/2025  12:37 EDT      Follow Up:   Return in about 2 months (around 6/24/2025).      MD MARIANNE Fernandes PC [facility]  [address] [place] [address] [place] [address]  Fax [phone]  Phone [phone]

## 2025-06-27 ENCOUNTER — OFFICE VISIT (OUTPATIENT)
Dept: FAMILY MEDICINE CLINIC | Facility: CLINIC | Age: 30
End: 2025-06-27

## 2025-06-27 VITALS
HEART RATE: 100 BPM | SYSTOLIC BLOOD PRESSURE: 180 MMHG | WEIGHT: 253.4 LBS | RESPIRATION RATE: 18 BRPM | HEIGHT: 67 IN | OXYGEN SATURATION: 96 % | DIASTOLIC BLOOD PRESSURE: 90 MMHG | BODY MASS INDEX: 39.77 KG/M2

## 2025-06-27 DIAGNOSIS — G47.33 OSA (OBSTRUCTIVE SLEEP APNEA): Primary | ICD-10-CM

## 2025-06-27 DIAGNOSIS — I50.9 CONGESTIVE HEART FAILURE, UNSPECIFIED HF CHRONICITY, UNSPECIFIED HEART FAILURE TYPE: ICD-10-CM

## 2025-06-27 DIAGNOSIS — I10 PRIMARY HYPERTENSION: ICD-10-CM

## 2025-06-27 RX ORDER — HYDROCHLOROTHIAZIDE 25 MG/1
25 TABLET ORAL DAILY
Qty: 90 TABLET | Refills: 0 | Status: SHIPPED | OUTPATIENT
Start: 2025-06-27

## 2025-06-27 NOTE — PROGRESS NOTES
"    Follow Up Office Visit      Date: 2025   Patient Name: Kenji Pizarro  : 1995   MRN: [id number]     Chief Complaint:    Chief Complaint   Patient presents with    Rapid Heart Rate    Hypertension    Sleep Apnea    Congestive Heart Failure       History of Present Illness: Kenji Pizarro is a 30 y.o. male who is here today for follow-up.    Patient reports that his blood pressure has been running high recently when he is checking this.  Numbers are consistent with blood pressure obtained in office today.  Patient reports that he is taking his blood pressure medication as prescribed.    We do extensively discussed patient's blood pressure and need for sleep study.    Subjective     I have reviewed the patients family history, social history, past medical history, past surgical history and have updated it as appropriate.     Medications:     Current Outpatient Medications:     acetaminophen (TYLENOL) 500 MG tablet, Take 1 tablet by mouth Every 6 (Six) Hours As Needed., Disp: , Rfl:     lisinopril (PRINIVIL,ZESTRIL) 40 MG tablet, Take 1 tablet by mouth Daily., Disp: 90 tablet, Rfl: 0    metoprolol succinate XL (Toprol XL) 100 MG 24 hr tablet, Take 1 tablet by mouth Daily., Disp: 90 tablet, Rfl: 0    hydroCHLOROthiazide 25 MG tablet, Take 1 tablet by mouth Daily., Disp: 90 tablet, Rfl: 0    Allergies:   No Known Allergies    Objective     Physical Exam:   Vital Signs:   Vitals:    25 0822 25 0840   BP: 172/94 180/90   Pulse: 109 100   Resp: 18    SpO2: 96%    Weight: 115 kg (253 lb 6.4 oz)    Height: 170.2 cm (67\")      Body mass index is 39.69 kg/m².      Physical Exam     General:  Adult male in no acute distress. BMI > 39. Alert and oriented. Vitals reviewed.  Head/ENT: Atraumatic. Mallampati score of IV.  Neck: Anatomy appears symmetrical.   Cardiac: Tachycardia on arrival that does improve by physical exam. Regular rhythm to auscultation.  Pulmonary: Lungs are clear to auscultation " bilaterally.  Extremities: There is no appreciated lower extremity edema bilaterally to palpation.  Integumentary/Skin: Skin appears unremarkable from observable skin surfaces.   Neurological: Normal gait and speech.  Behavioral/Psych: Patient behavior/demeanor appears consistent with reported age. Patient is pleasant with normal affect today.      Procedures      Assessment / Plan      1. Primary hypertension  - hydroCHLOROthiazide 25 MG tablet; Take 1 tablet by mouth Daily.  Dispense: 90 tablet; Refill: 0    2. RYLIE (obstructive sleep apnea)  - Ambulatory Referral to Social Care Services (Amb Case Mgmt)    3. Body mass index (BMI) of 39.0 to 39.9 in adult    4. Congestive heart failure, unspecified HF chronicity, unspecified heart failure type    Assessment & Plan  1. Hypertension  - Significant elevation after initial improvement on lisinopril and metoprolol alone  - Medication compliance reviewed; patient reports being compliant  - Add 25mg hydrochlorothiazide to current regimen of lisinopril and metoprolol  - May consider switching previously prescribed lisinopril to valsartan given previous heart failure  - Follow-up in 1 week for blood pressure check    2. Sleep apnea/BMI >39  - Strong suspicion of severe sleep apnea that could be contributing to patient hypertension  - Reports significant snoring at home.   - Physical examination shows BMI >39 w/ Mallampati Score IV with BMI >39.  - Referral to sleep medicine scheduled though unfortunately patient is self-pay presenting likely barrier to obtaining sleep study and subsequent CPAP  - Case management referral to assist with costs of sleep consultation, sleep study, and CPAP  - BMI likely contributing to overall clinical picture, would benefit from medication such as Ozempic, however, again, patient is self pay preventing this. Dietary/lifestyle changes recommended.    3. Heart failure  - Previously seen in hospital for heart failure thought secondary to  uncontrolled hypertension  - Echocardiogram on 03/19/2025 showed ejection fraction of 35 to 40%  - Currently on metoprolol and lisinopril alone  - Self-pay; cannot afford medications such as Farxiga or Entresto  - May consider switching lisinopril to valsartan as above  - Physical examination today shows no signs of pulmonary edema or lower extremity edema  - Follow-up with cardiology in the next couple of months; encouraged to keep appointment    Follow-up  - Patient to follow up in 1 week for blood pressure check     Patient or patient representative verbalized consent for the use of Ambient Listening during the visit with  Kaleb Gaitan MD for chart documentation. 6/27/2025  10:44 EDT     Follow Up:   Return in about 1 week (around 7/4/2025).      Kaleb Gaitan MD  [facility]  [address] [place] [address] [address] [place] [address]  Fax [phone]  Phone [phone]

## 2025-06-30 ENCOUNTER — REFERRAL TRIAGE (OUTPATIENT)
Age: 30
End: 2025-06-30

## 2025-07-01 ENCOUNTER — PATIENT OUTREACH (OUTPATIENT)
Age: 30
End: 2025-07-01

## 2025-07-01 NOTE — OUTREACH NOTE
Social Work Assessment  Questions/Answers      Flowsheet Row Most Recent Value   Referral Source physician   Reason for Consult community resources, insurance concerns   Preferred Language English   People in Home child(yogi), dependent, significant other   Current Living Arrangements home   Potentially Unsafe Housing Conditions unable to assess   In the past 12 months has the electric, gas, oil, or water company threatened to shut off services in your home? No   Primary Care Provided by self   Provides Primary Care For child(yogi)   Family Caregiver if Needed significant other   Quality of Family Relationships supportive   Source of Income salary/wages   Financial/Environmental Concerns other (see comments), insurance, none   Application for Public Assistance obtained public assistance pending number        SDOH updated and reviewed with the patient during this program:  --     Alcohol Use: Patient Declined (7/1/2025)    AUDIT-C     Frequency of Alcohol Consumption: Patient declined     Average Number of Drinks: Patient declined     Frequency of Binge Drinking: Patient declined      --     Depression: Not at risk (3/17/2025)    PHQ-2     PHQ-2 Score: 0      --     Disabilities: Not At Risk (3/17/2025)    Disabilities     Concentrating, Remembering, or Making Decisions Difficulty: no     Doing Errands Independently Difficulty: no      --     Employment: Not At Risk (3/17/2025)    Employment     Do you want help finding or keeping work or a job?: I do not need or want help      Financial Resource Strain: Low Risk  (7/1/2025)    Overall Financial Resource Strain (CARDIA)     Difficulty of Paying Living Expenses: Not very hard      --     Food Insecurity: No Food Insecurity (7/1/2025)    Hunger Vital Sign     Worried About Running Out of Food in the Last Year: Never true     Ran Out of Food in the Last Year: Never true      --     Health Literacy: Not At Risk (7/1/2025)    Education     Help with school or training?: No      Preferred Language: English      --     Housing Stability: Unknown (7/1/2025)    Housing Stability Vital Sign     Unable to Pay for Housing in the Last Year: No     Homeless in the Last Year: No      --     Interpersonal Safety: Patient Declined (7/1/2025)    Humiliation, Afraid, Rape, and Kick questionnaire     Fear of Current or Ex-Partner: Patient declined     Emotionally Abused: Patient declined     Physically Abused: Patient declined     Sexually Abused: Patient declined      --     Physical Activity: Patient Declined (7/1/2025)    Exercise Vital Sign     Days of Exercise per Week: Patient declined     Minutes of Exercise per Session: Patient declined      --     Social Connections: Unknown (7/1/2025)    Social Connection and Isolation Panel [NHANES]     Frequency of Communication with Friends and Family: More than three times a week     Marital Status: Living with partner      --     Stress: No Stress Concern Present (7/1/2025)    [employer] - Occupational Stress Questionnaire     Feeling of Stress : Only a little      --     Tobacco Use: Medium Risk (6/27/2025)    Patient History     Smoking Tobacco Use: Former     Smokeless Tobacco Use: Never     Passive Exposure: Never      --     Transportation Needs: No Transportation Needs (7/1/2025)    PRAPARE - Transportation     Lack of Transportation (Medical): No     Lack of Transportation (Non-Medical): No      --     Utilities: Not At Risk (7/1/2025)    [facility] Utilities     Threatened with loss of utilities: No      Continuing Care   [facility] [place]    [facility] [address] [facility] [id number]    Phone: [phone]    Request Status: Pending - No Request Sent    Services: Financial Assistance    Resource for: Financial Resource Strain, Utilities   Patient Outreach    SW spoke with pt re his referral. Pt has no insurance and needs a sleep study. SW discussed pt applying for Medicaid and also speaking with  the [facility] helpline to inquire about options for getting a QHP. SW provided contact information to each of these. Pt expressed thanks. CARY will continue to monitor for the next thirty days.     Sergio NATARAJAN -   Ambulatory Case Management    7/1/2025, 11:42 EDT

## 2025-08-12 ENCOUNTER — TELEPHONE (OUTPATIENT)
Dept: PULMONOLOGY | Facility: CLINIC | Age: 30
End: 2025-08-12

## 2025-08-12 ENCOUNTER — PATIENT OUTREACH (OUTPATIENT)
Age: 30
End: 2025-08-12

## 2025-08-12 ENCOUNTER — OFFICE VISIT (OUTPATIENT)
Dept: PULMONOLOGY | Facility: CLINIC | Age: 30
End: 2025-08-12

## 2025-08-12 VITALS
DIASTOLIC BLOOD PRESSURE: 80 MMHG | HEART RATE: 107 BPM | RESPIRATION RATE: 18 BRPM | WEIGHT: 259.2 LBS | BODY MASS INDEX: 40.68 KG/M2 | OXYGEN SATURATION: 97 % | SYSTOLIC BLOOD PRESSURE: 124 MMHG | HEIGHT: 67 IN

## 2025-08-12 DIAGNOSIS — I50.22 CHRONIC SYSTOLIC CONGESTIVE HEART FAILURE: ICD-10-CM

## 2025-08-12 DIAGNOSIS — R00.0 TACHYCARDIA: ICD-10-CM

## 2025-08-12 DIAGNOSIS — R06.83 SNORING: Primary | ICD-10-CM

## 2025-08-12 DIAGNOSIS — I10 PRIMARY HYPERTENSION: ICD-10-CM

## 2025-08-12 DIAGNOSIS — G47.19 EXCESSIVE DAYTIME SLEEPINESS: ICD-10-CM

## 2025-08-12 PROCEDURE — 99244 OFF/OP CNSLTJ NEW/EST MOD 40: CPT | Performed by: INTERNAL MEDICINE

## 2025-08-12 RX ORDER — METOPROLOL SUCCINATE 100 MG/1
100 TABLET, EXTENDED RELEASE ORAL DAILY
Qty: 90 TABLET | Refills: 0 | Status: SHIPPED | OUTPATIENT
Start: 2025-08-12

## 2025-08-12 RX ORDER — HYDROCHLOROTHIAZIDE 25 MG/1
25 TABLET ORAL DAILY
Qty: 90 TABLET | Refills: 0 | Status: SHIPPED | OUTPATIENT
Start: 2025-08-12

## (undated) DEVICE — CATH DIAG EXPO .052 PIG145 6F 110CM

## (undated) DEVICE — CATH F6 ST JR 4 100CM: Brand: SUPERTORQUE

## (undated) DEVICE — GLIDESHEATH SLENDER STAINLESS STEEL KIT: Brand: GLIDESHEATH SLENDER

## (undated) DEVICE — CATH F6 ST JL 3.5 100CM: Brand: SUPERTORQUE

## (undated) DEVICE — DGW .035 FC J3MM 260CM TEF: Brand: EMERALD

## (undated) DEVICE — TR BAND RADIAL ARTERY COMPRESSION DEVICE: Brand: TR BAND